# Patient Record
Sex: MALE | Race: WHITE | Employment: FULL TIME | URBAN - METROPOLITAN AREA
[De-identification: names, ages, dates, MRNs, and addresses within clinical notes are randomized per-mention and may not be internally consistent; named-entity substitution may affect disease eponyms.]

---

## 2018-03-15 ENCOUNTER — HOSPITAL ENCOUNTER (EMERGENCY)
Facility: HOSPITAL | Age: 58
Discharge: HOME | End: 2018-03-15
Attending: EMERGENCY MEDICINE
Payer: COMMERCIAL

## 2018-03-15 VITALS
TEMPERATURE: 99 F | HEART RATE: 58 BPM | OXYGEN SATURATION: 98 % | WEIGHT: 196 LBS | RESPIRATION RATE: 18 BRPM | SYSTOLIC BLOOD PRESSURE: 109 MMHG | DIASTOLIC BLOOD PRESSURE: 73 MMHG | BODY MASS INDEX: 27.44 KG/M2 | HEIGHT: 71 IN

## 2018-03-15 DIAGNOSIS — H11.423 CHEMOSIS OF CONJUNCTIVA OF BOTH EYES: Primary | ICD-10-CM

## 2018-03-15 PROCEDURE — 99283 EMERGENCY DEPT VISIT LOW MDM: CPT

## 2018-03-15 RX ORDER — TETRACAINE HYDROCHLORIDE 5 MG/ML
1 SOLUTION OPHTHALMIC ONCE
Status: COMPLETED | OUTPATIENT
Start: 2018-03-15 | End: 2018-03-15

## 2018-03-15 RX ORDER — CIPROFLOXACIN HYDROCHLORIDE 3.5 MG/ML
2 SOLUTION/ DROPS TOPICAL
Qty: 1 BOTTLE | Refills: 0 | Status: SHIPPED | OUTPATIENT
Start: 2018-03-15 | End: 2018-03-25

## 2018-03-15 NOTE — ED INITIAL ASSESSMENT (HPI)
Patient reports eye redness/feeling of pressure to bilateral eyes, reports he noticed the irritation 20 min ago and took his contacts out.

## 2018-03-16 NOTE — ED PROVIDER NOTES
Patient Seen in: [facility] Emergency Department    History   Patient presents with:   Eye Visual Problem (opthalmic)    Stated Complaint: eye problem     HPI    41-year-old male presents with redness and swelling to bilateral eyes that began approxi injection with reactive chemosis. No significant drainage. Under examination with floor seen there is no evidence of abrasion/laceration/lesions to the cornea or sclera.   Oropharynx clear, mucous membranes moist   Extremities: no edema, normal peripheral

## 2018-03-16 NOTE — ED NOTES
Report to [facility] RN at this time. Patient waiting to be seen by MD at this time. Resting comfortably on cart. Wife at bedside.

## 2018-05-24 ENCOUNTER — HOSPITAL ENCOUNTER (EMERGENCY)
Facility: HOSPITAL | Age: 58
Discharge: STILL ADMITTED OR ADMINISTRATIVE | End: 2018-05-25
Payer: COMMERCIAL

## 2018-07-16 ENCOUNTER — LAB ENCOUNTER (OUTPATIENT)
Dept: LAB | Facility: HOSPITAL | Age: 58
End: 2018-07-16
Payer: COMMERCIAL

## 2018-07-16 DIAGNOSIS — R69 DIAGNOSIS UNKNOWN: Primary | ICD-10-CM

## 2019-04-16 ENCOUNTER — APPOINTMENT (OUTPATIENT)
Dept: GENERAL RADIOLOGY | Facility: HOSPITAL | Age: 59
End: 2019-04-16
Payer: COMMERCIAL

## 2019-04-16 ENCOUNTER — HOSPITAL ENCOUNTER (EMERGENCY)
Facility: HOSPITAL | Age: 59
Discharge: HOME | End: 2019-04-16
Payer: COMMERCIAL

## 2019-04-16 VITALS
TEMPERATURE: 98 F | SYSTOLIC BLOOD PRESSURE: 141 MMHG | RESPIRATION RATE: 18 BRPM | DIASTOLIC BLOOD PRESSURE: 68 MMHG | OXYGEN SATURATION: 100 % | HEART RATE: 57 BPM | BODY MASS INDEX: 27 KG/M2 | WEIGHT: 190 LBS

## 2019-04-16 DIAGNOSIS — S46.911A ELBOW STRAIN, RIGHT, INITIAL ENCOUNTER: Primary | ICD-10-CM

## 2019-04-16 PROCEDURE — 99283 EMERGENCY DEPT VISIT LOW MDM: CPT

## 2019-04-16 PROCEDURE — 73080 X-RAY EXAM OF ELBOW: CPT

## 2019-04-16 NOTE — ED PROVIDER NOTES
Patient Seen in: [facility] Emergency Department    History   Patient presents with:  Upper Extremity Injury (musculoskeletal)    Stated Complaint: right elbow injury lifting weight.      HPI    This is a 52-year-old male who injured his right elbow respiratory distress    HEENT: There is no signs of trauma. Oral mucosa is wet. Lungs: Clear to auscultation without wheezing or retractions    Cardiovascular: Regular without murmurs  There is tenderness in the medial epicondyles.   And is painful on t There is no fracture, subluxation or dislocation. SOFT TISSUES:  Negative. No visible soft tissue swelling. EFFUSION:  None visible. OTHER:  Negative. CONCLUSION:  Mild spurring of the lateral distal humeral epicondyle.   No acute fracture, subluxatio

## 2019-04-17 NOTE — ED NOTES
Pt hand grasps equal . Pt stating he felt a pop and is concerned because he had elbow surgery years ago. No deformity or edema noted.

## 2019-11-03 ENCOUNTER — APPOINTMENT (OUTPATIENT)
Dept: GENERAL RADIOLOGY | Facility: HOSPITAL | Age: 59
End: 2019-11-03
Attending: EMERGENCY MEDICINE
Payer: COMMERCIAL

## 2019-11-03 ENCOUNTER — HOSPITAL ENCOUNTER (EMERGENCY)
Facility: HOSPITAL | Age: 59
Discharge: HOME | End: 2019-11-03
Attending: EMERGENCY MEDICINE
Payer: COMMERCIAL

## 2019-11-03 VITALS
DIASTOLIC BLOOD PRESSURE: 76 MMHG | OXYGEN SATURATION: 95 % | SYSTOLIC BLOOD PRESSURE: 122 MMHG | RESPIRATION RATE: 13 BRPM | HEART RATE: 69 BPM | HEIGHT: 71 IN | BODY MASS INDEX: 25.9 KG/M2 | TEMPERATURE: 98 F | WEIGHT: 185 LBS

## 2019-11-03 DIAGNOSIS — J06.9 UPPER RESPIRATORY TRACT INFECTION, UNSPECIFIED TYPE: Primary | ICD-10-CM

## 2019-11-03 PROCEDURE — 93010 ELECTROCARDIOGRAM REPORT: CPT

## 2019-11-03 PROCEDURE — 80053 COMPREHEN METABOLIC PANEL: CPT | Performed by: EMERGENCY MEDICINE

## 2019-11-03 PROCEDURE — 71046 X-RAY EXAM CHEST 2 VIEWS: CPT | Performed by: EMERGENCY MEDICINE

## 2019-11-03 PROCEDURE — 85025 COMPLETE CBC W/AUTO DIFF WBC: CPT | Performed by: EMERGENCY MEDICINE

## 2019-11-03 PROCEDURE — 36415 COLL VENOUS BLD VENIPUNCTURE: CPT

## 2019-11-03 PROCEDURE — 99285 EMERGENCY DEPT VISIT HI MDM: CPT

## 2019-11-03 PROCEDURE — 99284 EMERGENCY DEPT VISIT MOD MDM: CPT

## 2019-11-03 RX ORDER — DOXYCYCLINE HYCLATE 100 MG/1
100 CAPSULE ORAL 2 TIMES DAILY
Qty: 14 CAPSULE | Refills: 0 | Status: SHIPPED | OUTPATIENT
Start: 2019-11-03 | End: 2019-11-10

## 2019-11-03 RX ORDER — CODEINE PHOSPHATE AND GUAIFENESIN 10; 100 MG/5ML; MG/5ML
5 SOLUTION ORAL EVERY 6 HOURS PRN
Qty: 120 ML | Refills: 0 | Status: SHIPPED | OUTPATIENT
Start: 2019-11-03 | End: 2021-04-11

## 2019-11-03 NOTE — ED INITIAL ASSESSMENT (HPI)
Pt to ed with bronchitis dx last week at urgent care. Has been taking abx  Since thurs. rpts left sided chest pain, cough and sputum seem to be worsening.

## 2019-11-03 NOTE — ED PROVIDER NOTES
Patient Seen in: [facility] Emergency Department      History   Patient presents with:  Cough/URI    Stated Complaint:     HPI    Patient is a 52-year-old male history of high cholesterol and heart disease who presents with a productive cough, worse normal.      Pupils: Pupils are equal, round, and reactive to light. Neck:      Musculoskeletal: Normal range of motion and neck supple. Cardiovascular:      Rate and Rhythm: Normal rate and regular rhythm. Heart sounds: Normal heart sounds.    Pul days.  Has been on Augmentin for 3 days but reports seems like it is getting worse. However, no fevers or chills. On arrival here is afebrile, no distress. Check basic labs to see about his hydration, and a chest x-ray.       Update at 9:25 AM.  Labs, ch

## 2020-10-05 ENCOUNTER — OFFICE VISIT (OUTPATIENT)
Dept: SURGERY | Facility: CLINIC | Age: 60
End: 2020-10-05
Payer: COMMERCIAL

## 2020-10-05 VITALS — SYSTOLIC BLOOD PRESSURE: 145 MMHG | DIASTOLIC BLOOD PRESSURE: 90 MMHG | HEART RATE: 90 BPM

## 2020-10-05 DIAGNOSIS — N40.1 BPH WITH OBSTRUCTION/LOWER URINARY TRACT SYMPTOMS: ICD-10-CM

## 2020-10-05 DIAGNOSIS — R82.90 URINE FINDING: Primary | ICD-10-CM

## 2020-10-05 DIAGNOSIS — N13.8 BPH WITH OBSTRUCTION/LOWER URINARY TRACT SYMPTOMS: ICD-10-CM

## 2020-10-05 DIAGNOSIS — Z12.5 SCREENING PSA (PROSTATE SPECIFIC ANTIGEN): ICD-10-CM

## 2020-10-05 DIAGNOSIS — N45.1 LEFT EPIDIDYMITIS: ICD-10-CM

## 2020-10-05 PROCEDURE — 3077F SYST BP >= 140 MM HG: CPT | Performed by: UROLOGY

## 2020-10-05 PROCEDURE — 3080F DIAST BP >= 90 MM HG: CPT | Performed by: UROLOGY

## 2020-10-05 PROCEDURE — 99244 OFF/OP CNSLTJ NEW/EST MOD 40: CPT | Performed by: UROLOGY

## 2020-10-05 PROCEDURE — 81003 URINALYSIS AUTO W/O SCOPE: CPT | Performed by: UROLOGY

## 2020-10-05 RX ORDER — CIPROFLOXACIN 500 MG/1
500 TABLET, FILM COATED ORAL 2 TIMES DAILY
Qty: 28 TABLET | Refills: 0 | Status: SHIPPED | OUTPATIENT
Start: 2020-10-05 | End: 2020-10-19

## 2020-10-05 NOTE — PATIENT INSTRUCTIONS
Epididymitis  The epididymis is a small tube next to the testicle that stores sperm. Inflammation of the epididymis can cause pain and swelling in your scrotum.  The condition may be acute (sudden onset) or chronic (persisting for a longer period of pernell Home care  The following will help you care for yourself at home:  · Support the scrotum. When lying down, place a rolled towel under the scrotum. When walking, use an athletic supporter or 2 pairs of jockey-style underwear.   · Rest at home until the fever · Constipation can make you strain. This makes the pain worse. Avoid constipation by eating natural laxatives such as prunes, fresh fruits, and whole-grain cereals. If necessary, use a mild over-the-counter laxative such as colace for constipation.  [place]

## 2020-10-06 ENCOUNTER — LAB ENCOUNTER (OUTPATIENT)
Dept: LAB | Facility: HOSPITAL | Age: 60
End: 2020-10-06
Attending: UROLOGY
Payer: COMMERCIAL

## 2020-10-06 DIAGNOSIS — Z12.5 SCREENING PSA (PROSTATE SPECIFIC ANTIGEN): ICD-10-CM

## 2020-10-06 PROCEDURE — 36415 COLL VENOUS BLD VENIPUNCTURE: CPT

## 2020-10-28 ENCOUNTER — HOSPITAL ENCOUNTER (OUTPATIENT)
Dept: ULTRASOUND IMAGING | Facility: HOSPITAL | Age: 60
Discharge: HOME | End: 2020-10-28
Attending: UROLOGY
Payer: COMMERCIAL

## 2020-10-28 DIAGNOSIS — N45.1 LEFT EPIDIDYMITIS: ICD-10-CM

## 2020-10-28 PROCEDURE — 76870 US EXAM SCROTUM: CPT | Performed by: UROLOGY

## 2020-10-29 NOTE — PROGRESS NOTES
Fidel Lemus,  I have reviewed your test results and spoke to you on the phone. Everything looks OK. Would recommend you work on getting back pain under control and try the things we discussed over the phone and in the office.  Please call if you have any ques

## 2021-04-01 DIAGNOSIS — Z23 NEED FOR VACCINATION: ICD-10-CM

## 2021-04-11 ENCOUNTER — APPOINTMENT (OUTPATIENT)
Dept: CT IMAGING | Facility: HOSPITAL | Age: 61
DRG: 246 | End: 2021-04-11
Attending: INTERNAL MEDICINE
Payer: COMMERCIAL

## 2021-04-11 ENCOUNTER — APPOINTMENT (OUTPATIENT)
Dept: CT IMAGING | Facility: HOSPITAL | Age: 61
DRG: 246 | End: 2021-04-11
Attending: EMERGENCY MEDICINE
Payer: COMMERCIAL

## 2021-04-11 ENCOUNTER — APPOINTMENT (OUTPATIENT)
Dept: GENERAL RADIOLOGY | Facility: HOSPITAL | Age: 61
DRG: 246 | End: 2021-04-11
Attending: EMERGENCY MEDICINE
Payer: COMMERCIAL

## 2021-04-11 ENCOUNTER — HOSPITAL ENCOUNTER (INPATIENT)
Facility: HOSPITAL | Age: 61
LOS: 6 days | Discharge: HOME | DRG: 246 | End: 2021-04-17
Attending: EMERGENCY MEDICINE | Admitting: HOSPITALIST
Payer: COMMERCIAL

## 2021-04-11 DIAGNOSIS — R55 SYNCOPE AND COLLAPSE: Primary | ICD-10-CM

## 2021-04-11 DIAGNOSIS — S22.42XA CLOSED FRACTURE OF MULTIPLE RIBS OF LEFT SIDE, INITIAL ENCOUNTER: ICD-10-CM

## 2021-04-11 DIAGNOSIS — N17.9 AKI (ACUTE KIDNEY INJURY) (HCC): ICD-10-CM

## 2021-04-11 DIAGNOSIS — I48.91 ATRIAL FIBRILLATION, NEW ONSET (HCC): ICD-10-CM
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
## 2021-04-11 PROCEDURE — 71275 CT ANGIOGRAPHY CHEST: CPT | Performed by: INTERNAL MEDICINE

## 2021-04-11 PROCEDURE — 71101 X-RAY EXAM UNILAT RIBS/CHEST: CPT | Performed by: EMERGENCY MEDICINE

## 2021-04-11 PROCEDURE — 70450 CT HEAD/BRAIN W/O DYE: CPT | Performed by: EMERGENCY MEDICINE

## 2021-04-11 PROCEDURE — 99223 1ST HOSP IP/OBS HIGH 75: CPT | Performed by: INTERNAL MEDICINE

## 2021-04-11 RX ORDER — ACETAMINOPHEN 325 MG/1
650 TABLET ORAL EVERY 4 HOURS PRN
Status: DISCONTINUED | OUTPATIENT
Start: 2021-04-11 | End: 2021-04-17

## 2021-04-11 RX ORDER — ASPIRIN 325 MG
325 TABLET ORAL DAILY
Status: DISCONTINUED | OUTPATIENT
Start: 2021-04-11 | End: 2021-04-17

## 2021-04-11 RX ORDER — MORPHINE SULFATE 2 MG/ML
1 INJECTION, SOLUTION INTRAMUSCULAR; INTRAVENOUS EVERY 2 HOUR PRN
Status: DISCONTINUED | OUTPATIENT
Start: 2021-04-11 | End: 2021-04-13

## 2021-04-11 RX ORDER — NITROGLYCERIN 0.4 MG/1
0.4 TABLET SUBLINGUAL EVERY 5 MIN PRN
Status: DISCONTINUED | OUTPATIENT
Start: 2021-04-11 | End: 2021-04-17

## 2021-04-11 RX ORDER — MORPHINE SULFATE 4 MG/ML
4 INJECTION, SOLUTION INTRAMUSCULAR; INTRAVENOUS EVERY 30 MIN PRN
Status: DISCONTINUED | OUTPATIENT
Start: 2021-04-11 | End: 2021-04-11

## 2021-04-11 RX ORDER — HYDROCODONE BITARTRATE AND ACETAMINOPHEN 5; 325 MG/1; MG/1
1 TABLET ORAL EVERY 4 HOURS PRN
Status: DISCONTINUED | OUTPATIENT
Start: 2021-04-11 | End: 2021-04-14

## 2021-04-11 RX ORDER — HYDROCODONE BITARTRATE AND ACETAMINOPHEN 5; 325 MG/1; MG/1
2 TABLET ORAL EVERY 4 HOURS PRN
Status: DISCONTINUED | OUTPATIENT
Start: 2021-04-11 | End: 2021-04-14

## 2021-04-11 RX ORDER — ONDANSETRON 2 MG/ML
4 INJECTION INTRAMUSCULAR; INTRAVENOUS EVERY 6 HOURS PRN
Status: DISCONTINUED | OUTPATIENT
Start: 2021-04-11 | End: 2021-04-17

## 2021-04-11 RX ORDER — HYDROCODONE BITARTRATE AND ACETAMINOPHEN 5; 325 MG/1; MG/1
1 TABLET ORAL ONCE
Status: COMPLETED | OUTPATIENT
Start: 2021-04-11 | End: 2021-04-11

## 2021-04-11 RX ORDER — METOPROLOL TARTRATE 5 MG/5ML
5 INJECTION INTRAVENOUS EVERY 4 HOURS PRN
Status: DISCONTINUED | OUTPATIENT
Start: 2021-04-11 | End: 2021-04-17

## 2021-04-11 RX ORDER — MORPHINE SULFATE 4 MG/ML
4 INJECTION, SOLUTION INTRAMUSCULAR; INTRAVENOUS EVERY 2 HOUR PRN
Status: DISCONTINUED | OUTPATIENT
Start: 2021-04-11 | End: 2021-04-13

## 2021-04-11 RX ORDER — SODIUM CHLORIDE 9 MG/ML
INJECTION, SOLUTION INTRAVENOUS CONTINUOUS
Status: DISCONTINUED | OUTPATIENT
Start: 2021-04-11 | End: 2021-04-14

## 2021-04-11 RX ORDER — MORPHINE SULFATE 2 MG/ML
2 INJECTION, SOLUTION INTRAMUSCULAR; INTRAVENOUS EVERY 2 HOUR PRN
Status: DISCONTINUED | OUTPATIENT
Start: 2021-04-11 | End: 2021-04-13

## 2021-04-11 NOTE — ED QUICK NOTES
Came back from Xray, per Xray tech, pt loss consciousness while doing the last view for xray. Complained of mild nausea, seen pt in room EKG ordered. To monitor, + pallor awake & cohernt. Dr espino informed about fall, @ bedside.  Small abrasion to forehea

## 2021-04-11 NOTE — H&P
[facility] [place] HOSPITALIST                                                               History & Physical         Merlinda Barb Patient Status:  Inpatient    1960 MRN [id number]   [facility] 8NE-A Attending Russ Choi MD   [facility] Day # 0 P of angioplasty    • Other and unspecified hyperlipidemia        Past Surgical History:   Procedure Laterality Date   • HERNIA SURGERY     • OTHER      reattachment tendon elbow bilaterl   • REMOVAL OF TONSILS,12+ Y/O     • TONSILLECTOMY         Family hist Value Date    WBC 9.2 04/11/2021    HGB 16.5 04/11/2021    HCT 47.4 04/11/2021    .0 04/11/2021    CREATSERUM 1.68 04/11/2021    BUN 30 04/11/2021     04/11/2021    K 5.0 04/11/2021     04/11/2021    CO2 24.0 04/11/2021     04/11/ [employer]) NRDR ([facility]) which includes the Dose Index   Registry.       PATIENT STATED HISTORY: (As transcribed by Technologist)  Syncopal episode at home, syncopal episode also in xray. Injury above right orbit.

## 2021-04-11 NOTE — PLAN OF CARE
Pt arrived from ER with spouse at bedside. C/O severe pain to left side of ribs. No lightheadedness or dizziness. No CP or SOB. Able to stand pt for orthostatics with 2 RNs present. Orthostatics positive but asymptomatic. AF on tele, low 100's.   Pt jenna Evaluate fluid balance, assess for edema, trend weights  Outcome: Progressing  Goal: Absence of cardiac arrhythmias or at baseline  Description: INTERVENTIONS:  - Continuous cardiac monitoring, monitor vital signs, obtain 12 lead EKG if indicated  - Evalua

## 2021-04-11 NOTE — ED QUICK NOTES
Report to Deon Ott. Transport paged. Pt updated on room number and eta to floor. MD remains at bedside.

## 2021-04-11 NOTE — ED INITIAL ASSESSMENT (HPI)
Jovana Hugo @ 0100 today while in the washroom, landed on L side.  Increasing pain with breathing since then denies any head injury or LOC

## 2021-04-11 NOTE — ED PROVIDER NOTES
Patient Seen in: [facility] Emergency Department      History   Patient presents with:  Fall    Stated Complaint: Garry Genin at 0100 into side of toliet. Rib pain with SIMA.  98% RA on arrival. Denies *    HPI/Subjective:   HPI    21-year-old male presents weekends    Drug use: No             Review of Systems    Positive for stated complaint: Fell at 0100 into side of toliet. Rib pain with SIMA. 98% RA on arrival. Denies *  Other systems are as noted in HPI. Constitutional and vital signs reviewed.       All ---------                               -----------         ------                     CBC W/ DIFFERENTIAL[[id number]]          Abnormal            Final result                 Please view results for these tests on the individual orders.    SOTO lung sounds bilaterally. Norco for pain. X-ray ribs ordered. While the patient was in x-ray getting his rib films completed he had a syncopal episode. He states he has no recollection of what happened.   He was not hit by any sudden pain as far as he

## 2021-04-11 NOTE — ED QUICK NOTES
Round on pt. Updated on eta for room number. Pt denies any needs. Lights turned off in room. Family at bedside.

## 2021-04-11 NOTE — CONSULTS
[facility]  Consultation Note    Ro Carias Patient Status:  Emergency    1960 MRN [id number]   Location [address] Attending Vikas Taylor MD   Hosp Day # 0 PCP Rani Crowe MD     Cardiology att SURGERY     • OTHER      reattachment tendon elbow bilaterl   • REMOVAL OF TONSILS,12+ Y/O     • TONSILLECTOMY       Family History   Problem Relation Age of Onset   • Heart Disease Father    • Cancer Father      Social History:   reports that he has quit AM            Assessment / Plan:      Hannah Hollingsworth is a 62year old male with PMH significant for CAD, possible seizure disorder, HL presenting to [facility] after falling in the bathroom at home.      Syncope  - 2 x today with associated rib fractur

## 2021-04-12 ENCOUNTER — APPOINTMENT (OUTPATIENT)
Dept: CV DIAGNOSTICS | Facility: HOSPITAL | Age: 61
DRG: 246 | End: 2021-04-12
Attending: PHYSICIAN ASSISTANT
Payer: COMMERCIAL

## 2021-04-12 ENCOUNTER — APPOINTMENT (OUTPATIENT)
Dept: CT IMAGING | Facility: HOSPITAL | Age: 61
DRG: 246 | End: 2021-04-12
Attending: INTERNAL MEDICINE
Payer: COMMERCIAL

## 2021-04-12 ENCOUNTER — APPOINTMENT (OUTPATIENT)
Dept: INTERVENTIONAL RADIOLOGY/VASCULAR | Facility: HOSPITAL | Age: 61
DRG: 246 | End: 2021-04-12
Attending: NURSE PRACTITIONER
Payer: COMMERCIAL

## 2021-04-12 ENCOUNTER — APPOINTMENT (OUTPATIENT)
Dept: INTERVENTIONAL RADIOLOGY/VASCULAR | Facility: HOSPITAL | Age: 61
DRG: 246 | End: 2021-04-12
Attending: INTERNAL MEDICINE
Payer: COMMERCIAL

## 2021-04-12 PROCEDURE — 4A033BC MEASUREMENT OF ARTERIAL PRESSURE, CORONARY, PERCUTANEOUS APPROACH: ICD-10-PCS | Performed by: INTERNAL MEDICINE

## 2021-04-12 PROCEDURE — 0JH602Z INSERTION OF MONITORING DEVICE INTO CHEST SUBCUTANEOUS TISSUE AND FASCIA, OPEN APPROACH: ICD-10-PCS | Performed by: INTERNAL MEDICINE

## 2021-04-12 PROCEDURE — 93306 TTE W/DOPPLER COMPLETE: CPT | Performed by: PHYSICIAN ASSISTANT

## 2021-04-12 PROCEDURE — 99232 SBSQ HOSP IP/OBS MODERATE 35: CPT | Performed by: INTERNAL MEDICINE

## 2021-04-12 PROCEDURE — 4A023N7 MEASUREMENT OF CARDIAC SAMPLING AND PRESSURE, LEFT HEART, PERCUTANEOUS APPROACH: ICD-10-PCS | Performed by: INTERNAL MEDICINE

## 2021-04-12 PROCEDURE — B211YZZ FLUOROSCOPY OF MULTIPLE CORONARY ARTERIES USING OTHER CONTRAST: ICD-10-PCS | Performed by: INTERNAL MEDICINE

## 2021-04-12 PROCEDURE — 75574 CT ANGIO HRT W/3D IMAGE: CPT | Performed by: INTERNAL MEDICINE

## 2021-04-12 RX ORDER — NITROGLYCERIN 20 MG/100ML
INJECTION INTRAVENOUS
Status: COMPLETED
Start: 2021-04-12 | End: 2021-04-12

## 2021-04-12 RX ORDER — NITROGLYCERIN 0.4 MG/1
0.4 TABLET SUBLINGUAL ONCE
Status: DISCONTINUED | OUTPATIENT
Start: 2021-04-12 | End: 2021-04-12 | Stop reason: HOSPADM

## 2021-04-12 RX ORDER — METOPROLOL TARTRATE 50 MG/1
50 TABLET, FILM COATED ORAL ONCE AS NEEDED
Status: DISCONTINUED | OUTPATIENT
Start: 2021-04-12 | End: 2021-04-17 | Stop reason: ALTCHOICE

## 2021-04-12 RX ORDER — METOPROLOL TARTRATE 50 MG/1
50 TABLET, FILM COATED ORAL ONCE AS NEEDED
Status: ACTIVE | OUTPATIENT
Start: 2021-04-12 | End: 2021-04-12

## 2021-04-12 RX ORDER — METOPROLOL TARTRATE 50 MG/1
100 TABLET, FILM COATED ORAL ONCE
Status: DISCONTINUED | OUTPATIENT
Start: 2021-04-13 | End: 2021-04-14

## 2021-04-12 RX ORDER — METOPROLOL TARTRATE 5 MG/5ML
5 INJECTION INTRAVENOUS SEE ADMIN INSTRUCTIONS
Status: DISCONTINUED | OUTPATIENT
Start: 2021-04-12 | End: 2021-04-12 | Stop reason: HOSPADM

## 2021-04-12 RX ORDER — METOPROLOL TARTRATE 50 MG/1
50 TABLET, FILM COATED ORAL ONCE
Status: DISCONTINUED | OUTPATIENT
Start: 2021-04-12 | End: 2021-04-14

## 2021-04-12 RX ORDER — NITROGLYCERIN 0.4 MG/1
TABLET SUBLINGUAL
Status: DISPENSED
Start: 2021-04-12 | End: 2021-04-12

## 2021-04-12 RX ORDER — METOPROLOL TARTRATE 50 MG/1
100 TABLET, FILM COATED ORAL ONCE
Status: DISCONTINUED | OUTPATIENT
Start: 2021-04-12 | End: 2021-04-14

## 2021-04-12 RX ORDER — METOPROLOL TARTRATE 100 MG/1
100 TABLET ORAL ONCE AS NEEDED
Status: ACTIVE | OUTPATIENT
Start: 2021-04-12 | End: 2021-04-12

## 2021-04-12 RX ORDER — VERAPAMIL HYDROCHLORIDE 2.5 MG/ML
INJECTION, SOLUTION INTRAVENOUS
Status: COMPLETED
Start: 2021-04-12 | End: 2021-04-12

## 2021-04-12 RX ORDER — METOPROLOL TARTRATE 100 MG/1
100 TABLET ORAL ONCE AS NEEDED
Status: DISCONTINUED | OUTPATIENT
Start: 2021-04-12 | End: 2021-04-17 | Stop reason: ALTCHOICE

## 2021-04-12 RX ORDER — HEPARIN SODIUM 5000 [USP'U]/ML
INJECTION, SOLUTION INTRAVENOUS; SUBCUTANEOUS
Status: DISCONTINUED
Start: 2021-04-12 | End: 2021-04-12 | Stop reason: WASHOUT

## 2021-04-12 RX ORDER — LIDOCAINE HYDROCHLORIDE AND EPINEPHRINE 10; 10 MG/ML; UG/ML
INJECTION, SOLUTION INFILTRATION; PERINEURAL
Status: COMPLETED
Start: 2021-04-12 | End: 2021-04-12

## 2021-04-12 RX ORDER — LIDOCAINE HYDROCHLORIDE 10 MG/ML
INJECTION, SOLUTION EPIDURAL; INFILTRATION; INTRACAUDAL; PERINEURAL
Status: COMPLETED
Start: 2021-04-12 | End: 2021-04-12

## 2021-04-12 RX ORDER — DILTIAZEM HYDROCHLORIDE 5 MG/ML
5 INJECTION INTRAVENOUS SEE ADMIN INSTRUCTIONS
Status: DISCONTINUED | OUTPATIENT
Start: 2021-04-12 | End: 2021-04-12 | Stop reason: HOSPADM

## 2021-04-12 RX ORDER — HEPARIN SODIUM 5000 [USP'U]/ML
INJECTION, SOLUTION INTRAVENOUS; SUBCUTANEOUS
Status: COMPLETED
Start: 2021-04-12 | End: 2021-04-12

## 2021-04-12 RX ORDER — SODIUM CHLORIDE 9 MG/ML
INJECTION, SOLUTION INTRAVENOUS
Status: DISCONTINUED | OUTPATIENT
Start: 2021-04-13 | End: 2021-04-12 | Stop reason: HOSPADM

## 2021-04-12 RX ORDER — METOPROLOL TARTRATE 50 MG/1
50 TABLET, FILM COATED ORAL ONCE
Status: DISCONTINUED | OUTPATIENT
Start: 2021-04-13 | End: 2021-04-14

## 2021-04-12 RX ORDER — MIDAZOLAM HYDROCHLORIDE 1 MG/ML
INJECTION INTRAMUSCULAR; INTRAVENOUS
Status: COMPLETED
Start: 2021-04-12 | End: 2021-04-12

## 2021-04-12 NOTE — PROGRESS NOTES
04/12/21 0931 04/12/21 0934 04/12/21 0936   Vital Signs   Pulse 56 54 69   Heart Rate Source Monitor  --   --    Resp 17  --   --    Respiratory Quality Normal  --   --    BP 99/63 114/77 120/71   MAP (mmHg) 72 86 83   BP Location Right arm  --   --

## 2021-04-12 NOTE — PROGRESS NOTES
Cath:    LM: Normal.  LAD: 60% range prox and 60% mid plaques. iFR 0.86 (significant). CX: Mild disease. RCA: Dominant. Mild disease. IFR: significant in mid LAD after proper normilization and zeroing. Radial.    Suggest possible PCI to LAD.   Wou

## 2021-04-12 NOTE — IMAGING NOTE
Pt scheduled for CT gated coronary. Denies any contrast allergies. Denies taking Viagra, Cialis, Levitra or Imdur. Room # 4 used for scanning. O2 2L NC  HR 47 during scan at 1134   66 cc of 0.9 NS given. 70 cc of contrast given. Tolerated exam well.

## 2021-04-12 NOTE — PROCEDURES
ILR implant    HPI: 61 yo with recurrent syncope and negative monitoring x 48h (with negative monitor in past) here for ILR to determine etiology of syncope    Patient was prepped and draped in the usual sterile fashion.   Consent had previously been obtain

## 2021-04-12 NOTE — OCCUPATIONAL THERAPY NOTE
OCCUPATIONAL THERAPY QUICK EVALUATION - INPATIENT    Room Number: 0988/0368-L  Evaluation Date: 4/12/2021     Type of Evaluation: Quick Eval  Presenting Problem: syncope and collapse    Physician Order: IP Consult to Occupational Therapy  Reason for [facility] None                PAIN ASSESSMENT  Rating: Unable to rate  Location: L knee       COGNITION  WNL    RANGE OF MOTION AND STRENGTH ASSESSMENT  Upper extremity ROM is within functional limits     Upper extremity strength is within functional limits     NEUR present. Pt educated on d/c recommendation. Pt verbalized understanding. Patient End of Session: Up in chair;Needs met;Call light within reach;RN aware of session/findings; All patient questions and concerns addressed;SCDs in place; Family present    A

## 2021-04-12 NOTE — PROGRESS NOTES
[facility]  Progress Note    Afshan Jones Patient Status:  Inpatient    1960 MRN [id number]   [facility] 8NE-A Attending Elizabeth Bolden MD   Hosp Day # 1 PCP Lc Rosen MD     CC: Syncope and fall at home, intractable left-dunia chest  Psychiatric: Appropriate mood and affect    Labs:   Lab Results   Component Value Date    WBC 6.4 04/12/2021    HGB 13.9 04/12/2021    HCT 39.8 04/12/2021    .0 04/12/2021    CREATSERUM 1.28 04/12/2021    BUN 23 04/12/2021     04/12/202 Daily(Beta Blocker)        ASSESSMENT / PLAN:     1. Syncope and fall at home and left-sided Anterior left 8th and 9th rib fractures status post fall at home  1. Pain management  2. IV fluids  3. Fall precautions  4.  Incentive spirometry every hour while a

## 2021-04-12 NOTE — PLAN OF CARE
Patient had Linq device inserted today with Dr. Tabatha Cabral. Patient tolerated procedure well. Signal of Linq after insertion recorded @ 0.38 mV. mepilex dressing in place, CDI. Patient given Linq monitor. Patient brought back to 8609.  Patient and wife awar

## 2021-04-12 NOTE — PHYSICAL THERAPY NOTE
PT orders received, chart reviewed. Attempted to see pt this am on 2 occasions: first pt having echo, second pt down for CT scan. Will attempt to see pt later as time allows.  RENEE

## 2021-04-12 NOTE — PROGRESS NOTES
[facility] Cardiology Progress Note        Mercedez Harris Patient Status:  Inpatient    1960 MRN [id number]   [facility] 8NE-A Attending Noemi Dickens MD   Hosp Day # 1 PCP Jaylon Bernardo MD     Subjective:  No car <0.045       No results for input(s): PTP, INR in the last 168 hours.               Impression:  Syncope  - previous hx of possible seizures, patient is not post ictal.  This is likely orthostatic though no clear etiology  CTA chest negative.  -echo pending

## 2021-04-12 NOTE — PHYSICAL THERAPY NOTE
PHYSICAL THERAPY QUICK EVALUATION - INPATIENT    Room Number: 3321/5654-M  Evaluation Date: 4/12/2021  Presenting Problem: syncope and collapse  Physician Order: PT Eval and Treat    X-ray ribs 4/12/21:  CONCLUSION:     1.  Anterior left 8th and 9th rib f TOLERANCE  Pulse: 51  Heart Rate Source: Monitor     BP: 116/72  BP Location: Left arm  BP Method: Automatic  Patient Position: Standing    O2 WALK       AM-PAC '6-Clicks' INPATIENT SHORT FORM - BASIC MOBILITY  How much difficulty does the patient currentl edema observed. Increased time spent on education and discussion with pt regarding the importance of safety awareness, fall precautions and activity level.  Also discussed symptom management techniques for L rib pain and L knee pain such as positioning and

## 2021-04-12 NOTE — PROGRESS NOTES
Pt received from cath lab, placed on tele, Right Wrist with TR band (11 ml air per report), soft on palpation around TR band, radial pulses palpable. Lower mid chest incision with Mepilex, dressing CDI, soft on palpation.  Vital signs and site assessment pe

## 2021-04-12 NOTE — PLAN OF CARE
Pt alert and oriented x4. On RA. Denies any SOB. SB on tele. Continent to bowel and bladder. C/o pain at rib cage d/t fracture. Controlled with prn morphine and norco. Up with SBA. Orthos Q shift. Call light within reach. Will continue to monitor.      Prob

## 2021-04-12 NOTE — CONSULTS
I was asked by Dr. Shaheen Wadsworth to evaluate for AF, syncope    62year old male with PMhx as below, previously diagnosed with ? szr disorder 2014 admitted with syncope    USOH, palps in last year he noticed in paroxysmal fashion, unclear if he had last night. NT/ND, BS+x4  Back: No CVA tenderness  Extremities: Warm, dry, no LE edema bilat  Pulses: 2+ bilat DP  Skin: no rashes or legions noted  Neurological:  AAOx3, MAEW  Psych: Appropriate affect and response to questions      Labs:    HEM:  Recent Labs   Lab 0

## 2021-04-12 NOTE — PLAN OF CARE
Pt A&O x 4, SPO2 98% on RA, sinus dre on tele. Up with contact guard assist with walker. Orthostatics negative. Patient slight dizzy getting out of bed. C/o Left ribcage pain, PRN pain medication given, verbalize relief.  Scheduled for CTA gated coronary ordered  Outcome: Progressing

## 2021-04-13 ENCOUNTER — APPOINTMENT (OUTPATIENT)
Dept: INTERVENTIONAL RADIOLOGY/VASCULAR | Facility: HOSPITAL | Age: 61
DRG: 246 | End: 2021-04-13
Attending: INTERNAL MEDICINE
Payer: COMMERCIAL

## 2021-04-13 ENCOUNTER — APPOINTMENT (OUTPATIENT)
Dept: CT IMAGING | Facility: HOSPITAL | Age: 61
DRG: 246 | End: 2021-04-13
Attending: HOSPITALIST
Payer: COMMERCIAL

## 2021-04-13 PROCEDURE — 99232 SBSQ HOSP IP/OBS MODERATE 35: CPT | Performed by: INTERNAL MEDICINE

## 2021-04-13 PROCEDURE — B240ZZ3 ULTRASONOGRAPHY OF SINGLE CORONARY ARTERY, INTRAVASCULAR: ICD-10-PCS | Performed by: INTERNAL MEDICINE

## 2021-04-13 PROCEDURE — 4A023N7 MEASUREMENT OF CARDIAC SAMPLING AND PRESSURE, LEFT HEART, PERCUTANEOUS APPROACH: ICD-10-PCS | Performed by: INTERNAL MEDICINE

## 2021-04-13 PROCEDURE — B2111ZZ FLUOROSCOPY OF MULTIPLE CORONARY ARTERIES USING LOW OSMOLAR CONTRAST: ICD-10-PCS | Performed by: INTERNAL MEDICINE

## 2021-04-13 PROCEDURE — 027034Z DILATION OF CORONARY ARTERY, ONE ARTERY WITH DRUG-ELUTING INTRALUMINAL DEVICE, PERCUTANEOUS APPROACH: ICD-10-PCS | Performed by: INTERNAL MEDICINE

## 2021-04-13 PROCEDURE — 74176 CT ABD & PELVIS W/O CONTRAST: CPT | Performed by: HOSPITALIST

## 2021-04-13 RX ORDER — HYDROMORPHONE HYDROCHLORIDE 1 MG/ML
2 INJECTION, SOLUTION INTRAMUSCULAR; INTRAVENOUS; SUBCUTANEOUS EVERY 2 HOUR PRN
Status: DISCONTINUED | OUTPATIENT
Start: 2021-04-13 | End: 2021-04-17

## 2021-04-13 RX ORDER — NALOXONE HYDROCHLORIDE 4 MG/.1ML
4 SPRAY, METERED NASAL AS NEEDED
Qty: 1 KIT | Refills: 0 | Status: SHIPPED | OUTPATIENT
Start: 2021-04-13 | End: 2021-04-20

## 2021-04-13 RX ORDER — CLOPIDOGREL BISULFATE 75 MG/1
75 TABLET ORAL DAILY
Qty: 90 TABLET | Refills: 3 | Status: ON HOLD | OUTPATIENT
Start: 2021-04-14 | End: 2021-11-01

## 2021-04-13 RX ORDER — DOCUSATE SODIUM 100 MG/1
100 CAPSULE, LIQUID FILLED ORAL 2 TIMES DAILY
Status: DISCONTINUED | OUTPATIENT
Start: 2021-04-13 | End: 2021-04-17

## 2021-04-13 RX ORDER — HYDROMORPHONE HYDROCHLORIDE 1 MG/ML
0.5 INJECTION, SOLUTION INTRAMUSCULAR; INTRAVENOUS; SUBCUTANEOUS EVERY 2 HOUR PRN
Status: DISCONTINUED | OUTPATIENT
Start: 2021-04-13 | End: 2021-04-17

## 2021-04-13 RX ORDER — HYDROCODONE BITARTRATE AND ACETAMINOPHEN 5; 325 MG/1; MG/1
1 TABLET ORAL EVERY 4 HOURS PRN
Qty: 20 TABLET | Refills: 0 | Status: SHIPPED | OUTPATIENT
Start: 2021-04-13 | End: 2021-04-17

## 2021-04-13 RX ORDER — PSEUDOEPHEDRINE HCL 30 MG
100 TABLET ORAL 2 TIMES DAILY
Qty: 10 CAPSULE | Refills: 0 | Status: SHIPPED | OUTPATIENT
Start: 2021-04-13 | End: 2021-05-12

## 2021-04-13 RX ORDER — CLOPIDOGREL BISULFATE 75 MG/1
TABLET ORAL
Status: COMPLETED
Start: 2021-04-13 | End: 2021-04-13

## 2021-04-13 RX ORDER — HYDROMORPHONE HYDROCHLORIDE 1 MG/ML
1 INJECTION, SOLUTION INTRAMUSCULAR; INTRAVENOUS; SUBCUTANEOUS EVERY 2 HOUR PRN
Status: DISCONTINUED | OUTPATIENT
Start: 2021-04-13 | End: 2021-04-17

## 2021-04-13 RX ORDER — ASPIRIN 81 MG/1
81 TABLET ORAL DAILY
Qty: 90 TABLET | Refills: 3 | Status: ON HOLD | OUTPATIENT
Start: 2021-04-13 | End: 2021-04-25

## 2021-04-13 RX ORDER — ATORVASTATIN CALCIUM 80 MG/1
80 TABLET, FILM COATED ORAL NIGHTLY
Qty: 90 TABLET | Refills: 3 | Status: SHIPPED | OUTPATIENT
Start: 2021-04-13 | End: 2022-01-20

## 2021-04-13 RX ORDER — SODIUM CHLORIDE 9 MG/ML
INJECTION, SOLUTION INTRAVENOUS ONCE
Status: DISCONTINUED | OUTPATIENT
Start: 2021-04-13 | End: 2021-04-14

## 2021-04-13 RX ORDER — HEPARIN SODIUM 5000 [USP'U]/ML
INJECTION, SOLUTION INTRAVENOUS; SUBCUTANEOUS
Status: COMPLETED
Start: 2021-04-13 | End: 2021-04-13

## 2021-04-13 RX ORDER — ONDANSETRON 2 MG/ML
4 INJECTION INTRAMUSCULAR; INTRAVENOUS EVERY 6 HOURS PRN
Status: DISCONTINUED | OUTPATIENT
Start: 2021-04-13 | End: 2021-04-17

## 2021-04-13 RX ORDER — CLOPIDOGREL BISULFATE 75 MG/1
75 TABLET ORAL DAILY
Status: DISCONTINUED | OUTPATIENT
Start: 2021-04-13 | End: 2021-04-17

## 2021-04-13 RX ORDER — LIDOCAINE HYDROCHLORIDE 10 MG/ML
INJECTION, SOLUTION EPIDURAL; INFILTRATION; INTRACAUDAL; PERINEURAL
Status: COMPLETED
Start: 2021-04-13 | End: 2021-04-13

## 2021-04-13 RX ORDER — NICARDIPINE HYDROCHLORIDE 2.5 MG/ML
INJECTION INTRAVENOUS
Status: DISCONTINUED
Start: 2021-04-13 | End: 2021-04-13 | Stop reason: WASHOUT

## 2021-04-13 RX ORDER — ATORVASTATIN CALCIUM 80 MG/1
80 TABLET, FILM COATED ORAL NIGHTLY
Status: DISCONTINUED | OUTPATIENT
Start: 2021-04-13 | End: 2021-04-17

## 2021-04-13 RX ORDER — MIDAZOLAM HYDROCHLORIDE 1 MG/ML
INJECTION INTRAMUSCULAR; INTRAVENOUS
Status: COMPLETED
Start: 2021-04-13 | End: 2021-04-13

## 2021-04-13 NOTE — PROGRESS NOTES
04/13/21 0619 04/13/21 0622 04/13/21 0624   Vital Signs   /79 132/75 118/73   MAP (mmHg) 92 90 83   BP Location Left arm Left arm Left arm   BP Method Automatic Automatic Automatic   Patient Position Lying Sitting Standing

## 2021-04-13 NOTE — PROCEDURES
[facility]    PATIENT'S NAME: Gladys Flor   ATTENDING PHYSICIAN: Fco Flor M.D. OPERATING PHYSICIAN: Janusz Sharp.  Tahir Garcia MD   PATIENT ACCOUNT#:   [id number]    LOCATION:  [address] [place] [address]  MEDICAL RECORD #:   [id number]       DATE OF BIRTH:  [birth year] to moderate diffuse disease. CONCLUSIONS:  A 62year-old with syncope. The patient does have a FFR (IFR) positive LAD. Clearly, there is not a critical lesion. This may have contributed to his syncope. For now, the patient will get a LINQ monitor.

## 2021-04-13 NOTE — PROCEDURES
[address]    PATIENT'S NAME: Zully    ATTENDING PHYSICIAN: Brenna Mae M.D. OPERATING PHYSICIAN: Harriett Valentine.  Sharyle Poisson, MD   PATIENT ACCOUNT#:   [id number]    LOCATION:  [address] [place] [address]  MEDICAL RECORD #:   [id number]       DATE OF BIRTH:  [birth year] Clearly, aggressive medical management is indicated in this young patient with CAD. Dictated By Aretha Aguirre MD  d: 04/13/2021 14:33:06  t: 04/13/2021 17:58:18  [facility] [id number]  [facility]/

## 2021-04-13 NOTE — PROGRESS NOTES
Cards    PCI to LAD with NEELA, 3.0x48mm Synergy. Posted with 3.5mm NC to 16ATM. IVUS with excellent result and good apposition. Femoral.    Perclose. Can DC today. FU with Antonina Lam or ROSALIE in 1-2 weeks.     [facility] MD

## 2021-04-13 NOTE — PROGRESS NOTES
[facility]  Progress Note    Yvan Revering Patient Status:  Inpatient    1960 MRN [id number]   [facility] 8NE-A Attending Abby Campos MD   Hosp Day # 2 PCP Roger Good MD     CC: Syncope and fall at home, intractable left-dunia lesions. No erythema.   No bruising seen at this time on the left side of the chest  Psychiatric: Appropriate mood and affect    Labs:   Lab Results   Component Value Date    WBC 5.9 04/13/2021    HGB 13.1 04/13/2021    HCT 39.4 04/13/2021    .0 04/1 every hour while awake  5. PT OT eval  6. Troponin negative  7. Seen by cardiology  2. Orthostatic hypotension with syncope at home  1. IV fluids  2. Normal saline bolus given yesterday and on admission  3. Orthostatics resolved today  4.  Fall precautions

## 2021-04-13 NOTE — PROGRESS NOTES
[facility] Cardiology Progress Note        Ashlee Byrd Patient Status:  Inpatient    1960 MRN [id number]   [facility] 8NE-A Attending Leah Marie MD   Hosp Day # 2 PCP Penny Bell MD     Subjective:  Grecia Gillespie 2.1   * 141* 109*       Recent Labs   Lab 04/11/21  0702   ALT 64*   AST 41*   ALB 3.8       Recent Labs   Lab 04/11/21  0702 04/11/21  1315 04/11/21  1731   TROP <0.045 <0.045 <0.045       No results for input(s): PTP, INR in the last 168 hours.

## 2021-04-13 NOTE — PLAN OF CARE
Alert and oriented x4. On RA. C/o chest pain d/t fractured ribs. Pain controlled with prn morphine/norco. SB on tele monitor. R radial incision site c/d/I, soft, no hematoma present. Left upper chest incision site c/d/I, covered with mepilex.  Continent to electrolytes and administer replacement therapy as ordered  Outcome: Progressing

## 2021-04-14 ENCOUNTER — APPOINTMENT (OUTPATIENT)
Dept: CT IMAGING | Facility: HOSPITAL | Age: 61
DRG: 246 | End: 2021-04-14
Attending: HOSPITALIST
Payer: COMMERCIAL

## 2021-04-14 PROCEDURE — 99255 IP/OBS CONSLTJ NEW/EST HI 80: CPT | Performed by: INTERNAL MEDICINE

## 2021-04-14 PROCEDURE — 74174 CTA ABD&PLVS W/CONTRAST: CPT | Performed by: HOSPITALIST

## 2021-04-14 PROCEDURE — 99233 SBSQ HOSP IP/OBS HIGH 50: CPT | Performed by: HOSPITALIST

## 2021-04-14 RX ORDER — SENNOSIDES 8.6 MG
8.6 TABLET ORAL 2 TIMES DAILY
Status: DISCONTINUED | OUTPATIENT
Start: 2021-04-14 | End: 2021-04-17

## 2021-04-14 RX ORDER — BISACODYL 10 MG
10 SUPPOSITORY, RECTAL RECTAL
Status: DISCONTINUED | OUTPATIENT
Start: 2021-04-14 | End: 2021-04-17

## 2021-04-14 RX ORDER — OXYCODONE HYDROCHLORIDE 10 MG/1
10 TABLET ORAL EVERY 4 HOURS PRN
Status: DISCONTINUED | OUTPATIENT
Start: 2021-04-14 | End: 2021-04-17

## 2021-04-14 RX ORDER — SODIUM CHLORIDE 9 MG/ML
INJECTION, SOLUTION INTRAVENOUS CONTINUOUS
Status: DISCONTINUED | OUTPATIENT
Start: 2021-04-14 | End: 2021-04-16

## 2021-04-14 RX ORDER — SODIUM CHLORIDE 9 MG/ML
INJECTION, SOLUTION INTRAVENOUS CONTINUOUS
Status: DISCONTINUED | OUTPATIENT
Start: 2021-04-14 | End: 2021-04-14

## 2021-04-14 RX ORDER — POLYETHYLENE GLYCOL 3350 17 G/17G
17 POWDER, FOR SOLUTION ORAL DAILY
Status: DISCONTINUED | OUTPATIENT
Start: 2021-04-14 | End: 2021-04-17

## 2021-04-14 NOTE — PROGRESS NOTES
[facility]  Progress Note    Katie Sinha Patient Status:  Inpatient    1960 MRN [id number]   [facility] 8NE-A Attending Grecia Bedoya MD   Hosp Day # 3 PCP Beka Oglesby MD     CC: Syncope and fall at home, intractable left-dunia Value Date    WBC 8.2 04/14/2021    HGB 9.7 04/14/2021    HCT 30.1 04/14/2021    .0 04/14/2021    CREATSERUM 1.56 04/14/2021    BUN 18 04/14/2021     04/14/2021    K 4.4 04/14/2021     04/14/2021    CO2 28.0 04/14/2021     04/14/2 (NORCO) 5-325 MG per tab 2 tablet, 2 tablet, Oral, Q4H PRN  lidocaine-menthol 4-1 % 1 patch, 1 patch, Transdermal, Daily  metoprolol Tartrate (LOPRESSOR) injection 5 mg, 5 mg, Intravenous, Q4H PRN  metoprolol Tartrate (LOPRESSOR) tab 25 mg, 25 mg, Oral, 2x

## 2021-04-14 NOTE — PROGRESS NOTES
[facility] Cardiology Progress Note        Liliana Lennon Patient Status:  Inpatient    1960 MRN [id number]   [facility] 8NE-A Attending Hodan Plaza MD   Hosp Day # 3 PCP Yeimi Calloway MD     Subjective:    Felicita Maza 04/14/21  0559   WBC 9.2 6.4 5.9 12.4* 8.2   HGB 16.5 13.9 13.1 11.7* 10.6*   .0 134.0* 125.0* 123.0* 122.0*       Chem:  Recent Labs   Lab 04/11/21  0702 04/11/21  1346 04/12/21  0542 04/13/21  0923 04/14/21  0559    140 141 140 139   K 5.0 4

## 2021-04-14 NOTE — PLAN OF CARE
Assumed care of pt @ 1415 from [address] [place] [address] Bypass, coming back from CT scan. Pt neurologically intact, complaining of 6/10 pain in L ribcage. Hematoma palpable on L side, bruising on L hip. Norco given for pain, no improvement.  Oxycodone ordered per hospitalist. Cesar Hackett

## 2021-04-14 NOTE — PROGRESS NOTES
1940    Pt. C/o nausea , cold and clammy and severe pain on his left mid back . Denies any chest pain ; s/p PCI , right groin soft , denies pain at the right groin.              Pt. With a big bruise at his upper hip area from the fall last 4/11. 2010 Zo

## 2021-04-14 NOTE — PLAN OF CARE
Denies c/o pain currently. We are working together as a team to work ahead of his pain and manage it well throughout the day. Umberto Rubio ok'd him to ambulate in the halls after the discovery of his retroperitoneal hematoma. Remains in SB, NL S1, S2, RRR. baseline  Description: INTERVENTIONS:  - Continuous cardiac monitoring, monitor vital signs, obtain 12 lead EKG if indicated  - Evaluate effectiveness of antiarrhythmic and heart rate control medications as ordered  - Initiate emergency measures for life t

## 2021-04-14 NOTE — CM/SW NOTE
Care Progression Note:  Active Acute Medical Issue:   Syncope and collapse     Other Contributing Medical Factors/Dx. : s/p linq device and pci of proximal LAD lesion with NEELA    Length of stay: 3  GMLOS:  Avoidable Delays:   Discharge Barriers: post proced

## 2021-04-14 NOTE — PROGRESS NOTES
Report given to [facility] ([facility]). Went down for stat CT scan of abdomen and on priority transport list to transfer to [facility].

## 2021-04-14 NOTE — PLAN OF CARE
Patient is alert and oriented, complaining of intermittent left sided rib pain r/t fractures. Norco & morphine administered as ordered. Stool softener given today and education provided regarding narcotics and side effects.  Cath lab completed today, 1 sten obtain 12 lead EKG if indicated  - Evaluate effectiveness of antiarrhythmic and heart rate control medications as ordered  - Initiate emergency measures for life threatening arrhythmias  - Monitor electrolytes and administer replacement therapy as ordered

## 2021-04-14 NOTE — CONSULTS
[facility]  Report of Consultation    Janeen Chavez Patient Status:  Inpatient    1960 MRN [id number]   [address] Attending Jeff Daley MD   Hosp Day # 3 PCP Sharri Padilla MD     Reason for Consultation:  FREDA    History Injection, ONCE PRN  •  Clopidogrel Bisulfate (PLAVIX) tab 75 mg, 75 mg, Oral, Daily  •  ondansetron HCl (ZOFRAN) injection 4 mg, 4 mg, Intravenous, Q6H PRN  •  HYDROmorphone HCl (DILAUDID) 1 MG/ML injection 0.5 mg, 0.5 mg, Intravenous, Q2H PRN **OR** HYDR Cap, Take 100 mg by mouth 2 (two) times daily. lidocaine-menthol 4-1 % External Patch, Place 1 patch onto the skin daily. Naloxone HCl 4 MG/0.1ML Nasal Liquid, 4 mg by Nasal route as needed.  If patient remains unresponsive, repeat dose in other nostril 2 137 04/14/2021    CA 8.5 04/14/2021       BUN (mg/dL)   Date Value   04/14/2021 18   04/13/2021 15   04/12/2021 23 (H)   04/24/2014 20     CREATININE (mg/dL)   Date Value   04/24/2014 1.21 (H)     Creatinine (mg/dL)   Date Value   04/14/2021 1.56 (H)   04/ Please do not hesitate to call with any questions or concerns.        Stevie Stagers  4/14/2021  12:51 PM

## 2021-04-14 NOTE — CONSULTS
Jacinto Saldaña MD.  [facility]  Vascular and Endovascular Surgery     VASCULAR SURGERY   CONSULT NOTE      Name: Lala Cool   :   1960  [id number]     2021       REFERRING PHYSICIAN: Jones Noguera MD  PRIMARY CARE PHYSICIAN: unspecified hyperlipidemia      PAST SURGICAL HISTORY:   Past Surgical History:   Procedure Laterality Date   • HERNIA SURGERY     • OTHER      reattachment tendon elbow bilaterl   • REMOVAL OF TONSILS,12+ Y/O     • TONSILLECTOMY       MEDICATIONS:     Cur HYDROcodone-acetaminophen (NORCO) 5-325 MG per tab 2 tablet, 2 tablet, Oral, Q4H PRN  •  lidocaine-menthol 4-1 % 1 patch, 1 patch, Transdermal, Daily  •  metoprolol Tartrate (LOPRESSOR) injection 5 mg, 5 mg, Intravenous, Q4H PRN  •  metoprolol Tartrate (LO 99.7 97.7  --    .0  --  134.0* 125.0* 123.0* 122.0*  --     < > = values in this interval not displayed.        Recent Labs   Lab 04/11/21  0702 04/11/21  1346 04/12/21  0542 04/13/21  0923 04/14/21  0559    140 141 140 139   K 5.0 4.1 4.0 4.0 abnormality. CONCLUSION:  No acute intracranial hemorrhage.    Dictated by (CST): Jennifer Blackburn MD on 4/11/2021 at 8:54 AM     Finalized by (CST): Jennifer Blackburn MD on 4/11/2021 at 8:56 AM       CT ANGIOGRAPHY, CHEST (CPT=71275)    Result Date: 4/11/20 [phone] ---------------------------------------------------------------------------- Transthoracic Echocardiogram Name:Pam Peoples Deon Date: 2021 :  1960 Ht:  (71in)  BP: 95 / 58 MRN:  [id number]    Age:  59years normal. Systolic function was normal. The estimated ejection fraction was 55-60%. Left ventricular diastolic function parameters were normal. Left atrium:  The left atrium was mildly dilated. The left atrial volume was mildly increased. Right ventricle:   Elle Silveira 14 - 22  LV PW thickness, ED, PLAX                       0.8   cm     0.6 - 1.0  IVS/LV PW ratio, ED, PLAX                       1.01         ---------  LV ejection fraction                            59    %      >=55  LV E/e', lateral Tricuspid regurg peak velocity                  2.63  m/sec  ---------  Tricuspid peak RV-RA gradient                   28    mm Hg  ---------  Tricuspid maximal regurg velocity, PISA         2.53  m/sec  ---------   Systemic veins the left kidney, displacing the kidney anteriorly, and causing mass effect and indentation of the surfaces of the left kidney.   There is continued contrast staining of the left kidney, appears related to a cardiac CT scan performed yesterday were IV contra as well as hematoma extending inferior from the left kidney along the retroperitoneum anterior to the left psoas muscle.   Left kidney is also displaced, and deformed by the hematoma, and demonstrates abnormal persistent contrast staining of the parenchyma, CARDIAC OVER READ  COMPARISON:  None. INDICATIONS:  over read  TECHNIQUE:  CT images of the chest were obtained as part of a cardiac CT evaluation. Please refer to Cardiologist report for contrast volume and technique.   Dose reduction techniques were use Radiologist over-read for evaluation of non-vascular structures. Dose reduction techniques were used. Dose information is transmitted to the [address] of Radiology) NRDR ([address]) which includes the Dose Index Registry. branches. LCx demonstrates nonobstructive (<25% stenosis) soft plaque involving the proximal LCx. OM branches demonstrate no stenosis or plaque. Right coronary artery (RCA) is normal in size.  RCA demonstrates nonobstructive (25-49% stenosis) calcified nick care.     Sincerely,  Eligio Gamino MD  04/14/21   1:21 PM

## 2021-04-15 ENCOUNTER — APPOINTMENT (OUTPATIENT)
Dept: GENERAL RADIOLOGY | Facility: HOSPITAL | Age: 61
DRG: 246 | End: 2021-04-15
Attending: HOSPITALIST
Payer: COMMERCIAL

## 2021-04-15 PROCEDURE — 99291 CRITICAL CARE FIRST HOUR: CPT | Performed by: INTERNAL MEDICINE

## 2021-04-15 PROCEDURE — 99232 SBSQ HOSP IP/OBS MODERATE 35: CPT | Performed by: HOSPITALIST

## 2021-04-15 PROCEDURE — 71045 X-RAY EXAM CHEST 1 VIEW: CPT | Performed by: HOSPITALIST

## 2021-04-15 RX ORDER — LACTULOSE 10 G/15ML
20 SOLUTION ORAL ONCE
Status: COMPLETED | OUTPATIENT
Start: 2021-04-15 | End: 2021-04-15

## 2021-04-15 NOTE — PROGRESS NOTES
Vascular Surgery Progress Note    /66 (BP Location: Right arm)   Pulse 83   Temp (!) 101.2 °F (38.4 °C) (Temporal)   Resp 14   Ht 5' 11\" (1.803 m)   Wt 202 lb 2.6 oz (91.7 kg)   SpO2 98%   BMI 28.20 kg/m²     Recent Labs   Lab 04/13/21  2324 04/13/2

## 2021-04-15 NOTE — PROGRESS NOTES
[facility]  Nephrology Progress Note    Katie Sinha Patient Status:  Inpatient    1960 MRN [id number]   [address] Attending Ginger Wetzel MD   Hosp Day # 4 PCP Beka Oglesby MD       SUBJECTIVE:    Still with L chest wal 04/12/21  0542 04/13/21  0923 04/14/21  0559    140 141 140 139   K 5.0 4.1 4.0 4.0 4.4    109 111 110 107   CO2 24.0 26.0 25.0 27.0 28.0   BUN 30* 25* 23* 15 18   CREATSERUM 1.68* 1.39* 1.28 1.21 1.56*   CA 8.9 8.6 8.2* 8.5 8.5   MG  --   -- (LOPRESSOR) injection 5 mg, 5 mg, Intravenous, Q4H PRN  metoprolol Tartrate (LOPRESSOR) tab 25 mg, 25 mg, Oral, 2x Daily(Beta Blocker)          Impression/Plan:      #1.  FREDA- broad differential of FREDA would include prerenal azotemia in setting of worsening

## 2021-04-15 NOTE — PROGRESS NOTES
[facility] Cardiology Progress Note        Liliana Saji Patient Status:  Inpatient    1960 MRN [id number]   [facility] 8NE-A Attending Hodan Plaza MD   [address] Day # 4 PCP Yeimi Calloway MD     Subjective:  Did re 04/14/21  1451 04/14/21  1750 04/14/21  2309 04/15/21  0721   WBC 5.9  --  12.4*  --  8.2  --   --  7.7  --   --  8.0   HGB 13.1   < > 11.7*   < > 10.6*   < > 9.7* 8.9* 9.2* 8.7* 9.6*  9.6*   .0*  --  123.0*  --  122.0*  --   --  111.0*  --   --  12

## 2021-04-15 NOTE — PLAN OF CARE
No acute issues overnight. PO PRNs effective for pain control. Morning labs collected late, results pending.

## 2021-04-15 NOTE — PROGRESS NOTES
[facility]  Progress Note    Mercedez Harris Patient Status:  Inpatient    1960 MRN [id number]   [facility] 8NE-A Attending Noemi Dickens MD   [address] Day # 4 PCP Jaylon Bernardo MD     CC: Syncope and fall at home, intractable left-dunia affect    Labs:   Lab Results   Component Value Date    WBC 8.0 04/15/2021    HGB 9.6 04/15/2021    HGB 9.6 04/15/2021    HCT 27.3 04/15/2021    .0 04/15/2021    CREATSERUM 1.53 04/15/2021    BUN 14 04/15/2021     04/15/2021    K 4.0 04/15/202 650 mg, 650 mg, Oral, Q4H PRN  lidocaine-menthol 4-1 % 1 patch, 1 patch, Transdermal, Daily  metoprolol Tartrate (LOPRESSOR) injection 5 mg, 5 mg, Intravenous, Q4H PRN  metoprolol Tartrate (LOPRESSOR) tab 25 mg, 25 mg, Oral, 2x Daily(Beta Blocker)        A

## 2021-04-15 NOTE — PLAN OF CARE
Assumed care of pt this am, he is resting comfortably in bed with c/o left flank pain 6/10 on pain scale, pain meds given as ordered.  Pt states he has not had a bowel movement in 5 days, order rcvd for milk of mag and lactulose which was given will [place]

## 2021-04-16 PROCEDURE — 99232 SBSQ HOSP IP/OBS MODERATE 35: CPT | Performed by: HOSPITALIST

## 2021-04-16 PROCEDURE — 99291 CRITICAL CARE FIRST HOUR: CPT | Performed by: INTERNAL MEDICINE

## 2021-04-16 NOTE — PROGRESS NOTES
[facility]  Progress Note    Zachamaury Aretha Patient Status:  Inpatient    1960 MRN [id number]   [facility] 8NE-A Attending Luz Maria Miller MD   [facility] Day # 5 PCP Rani Crowe MD     CC: Syncope and fall at home, intractable left-dunia Component Value Date    HGB 8.2 04/16/2021    CREATSERUM 1.54 04/16/2021    BUN 10 04/16/2021     04/16/2021    K 4.2 04/16/2021     04/16/2021    CO2 32.0 04/16/2021     04/16/2021    CA 8.1 04/16/2021    ALB 2.7 04/16/2021    [address] mg, Oral, Q4H PRN  lidocaine-menthol 4-1 % 1 patch, 1 patch, Transdermal, Daily  metoprolol Tartrate (LOPRESSOR) injection 5 mg, 5 mg, Intravenous, Q4H PRN  metoprolol Tartrate (LOPRESSOR) tab 25 mg, 25 mg, Oral, 2x Daily(Beta Blocker)        ASSESSMENT /

## 2021-04-16 NOTE — PROGRESS NOTES
[facility] Cardiology Progress Note        Adinageovani Webber Patient Status:  Inpatient    1960 MRN [id number]   [facility] 8NE-A Attending Anthony Danielson MD   Hosp Day # 5 PCP Meet Mckeon MD     Subjective:  Did we 04/13/21  3291 04/13/21  0290 04/13/21  2324 04/13/21  2324 04/14/21  0559 04/14/21  1034 04/14/21  1451 04/14/21  1750 04/15/21  0721 04/15/21  1103 04/15/21  1804 04/16/21  0031 04/16/21  0419   WBC 5.9  --  12.4*  --  8.2  --  7.7  --  8.0  --   --   -- q12.  Same cardiac meds. Mobilize. May be ok for dc tomorrow.

## 2021-04-16 NOTE — PLAN OF CARE
Assumed care of pt @ 1930. Rec'd pt in bed, resting. Pt. Ambulated to bathroom without difficulty. Pt. Reports left flank pain, oxycodone given per MAR.  VSS, afebrile,  in no acute distress.

## 2021-04-16 NOTE — PROGRESS NOTES
[facility]  Nephrology Progress Note    Sherine Brody Patient Status:  Inpatient    1960 MRN [id number]   [facility] 6NE-A Attending Julieth Garcia MD   Hosp Day # 5 PCP Janeth Metcalf MD       SUBJECTIVE:  Doing well this AM, abd > = values in this interval not displayed.        Recent Labs   Lab 04/12/21  0542 04/13/21  0923 04/14/21  0559 04/15/21  0721 04/16/21  0419    140 139 139 139   K 4.0 4.0 4.4 4.0 4.2    110 107 107 107   CO2 25.0 27.0 28.0 28.0 32.0   BUN 23* SL tab 0.4 mg, 0.4 mg, Sublingual, Q5 Min PRN  ondansetron HCl (ZOFRAN) injection 4 mg, 4 mg, Intravenous, Q6H PRN  acetaminophen (TYLENOL) tab 650 mg, 650 mg, Oral, Q4H PRN  lidocaine-menthol 4-1 % 1 patch, 1 patch, Transdermal, Daily  metoprolol Tartrate

## 2021-04-16 NOTE — PLAN OF CARE
Problem: Patient/Family Goals  Goal: Patient/Family Long Term Goal  Description: Patient's Long Term Goal: To feel better enough to leave the hospital by 4/13    Interventions:  - IV fluids  - Pain controll  - Orthos Q shift  - Medications   - PT/ OT  - walk without assistance. Hemoglobin at 8.2, cardiology aware, no new orders. Plan is to monitor hemoglobin levels and transfer patient to a normal floor today.   Problem: PAIN - ADULT  Goal: Verbalizes/displays adequate comfort level or patient's stated gayle

## 2021-04-17 VITALS
DIASTOLIC BLOOD PRESSURE: 56 MMHG | BODY MASS INDEX: 28.21 KG/M2 | HEART RATE: 83 BPM | WEIGHT: 201.5 LBS | RESPIRATION RATE: 18 BRPM | SYSTOLIC BLOOD PRESSURE: 107 MMHG | TEMPERATURE: 99 F | HEIGHT: 71 IN | OXYGEN SATURATION: 95 %

## 2021-04-17 PROCEDURE — 99239 HOSP IP/OBS DSCHRG MGMT >30: CPT | Performed by: HOSPITALIST

## 2021-04-17 PROCEDURE — 99233 SBSQ HOSP IP/OBS HIGH 50: CPT | Performed by: INTERNAL MEDICINE

## 2021-04-17 RX ORDER — MELATONIN
325 2 TIMES DAILY WITH MEALS
Qty: 60 TABLET | Refills: 0 | Status: SHIPPED | OUTPATIENT
Start: 2021-04-17 | End: 2021-05-17

## 2021-04-17 RX ORDER — ASPIRIN 81 MG/1
81 TABLET ORAL DAILY
Status: DISCONTINUED | OUTPATIENT
Start: 2021-04-17 | End: 2021-04-17

## 2021-04-17 RX ORDER — AMOXICILLIN AND CLAVULANATE POTASSIUM 875; 125 MG/1; MG/1
1 TABLET, FILM COATED ORAL 2 TIMES DAILY
Qty: 8 TABLET | Refills: 0 | Status: SHIPPED | OUTPATIENT
Start: 2021-04-17 | End: 2021-04-20

## 2021-04-17 RX ORDER — OXYCODONE HYDROCHLORIDE 10 MG/1
10 TABLET ORAL EVERY 4 HOURS PRN
Qty: 30 TABLET | Refills: 0 | Status: SHIPPED | OUTPATIENT
Start: 2021-04-17 | End: 2021-05-12

## 2021-04-17 RX ORDER — ASPIRIN 81 MG/1
81 TABLET ORAL DAILY
Status: DISCONTINUED | OUTPATIENT
Start: 2021-04-18 | End: 2021-04-17

## 2021-04-17 NOTE — PLAN OF CARE
Received pt at 0730. A&o x4. NSR on tele. VSS. Denies chest pain and SOB. WNL on room air. Up ad roberto. Last BM 4/15 with good urine output. R wrist open to air, soft, no hematoma. R groin open to air, soft, no hematoma. C/o pain in L ribs.  Relieved by PRN p adequate comfort level or patient's stated pain goal  Description: INTERVENTIONS:  - Encourage pt to monitor pain and request assistance  - Assess pain using appropriate pain scale  - Administer analgesics based on type and severity of pain and evaluate re

## 2021-04-17 NOTE — PROGRESS NOTES
[facility]  Nephrology Progress Note    Diane Keller Patient Status:  Inpatient    1960 MRN [id number]   [facility] 6NE-A Attending Germania Oliver MD   [facility] Day # 6 PCP Yuki Clements MD       SUBJECTIVE:    Feels better today  Danielle Deutsch °C), Max:100 °F (37.8 °C)       Intake/Output Summary (Last 24 hours) at 4/17/2021 0919  Last data filed at 4/17/2021 0901  Gross per 24 hour   Intake 730 ml   Output 1 ml   Net 729 ml     Last 3 Weights  04/16/21 0557 : 201 lb 8 oz (91.4 kg)  04/15/21 043 f/u labs on Monday       2.  afib/CAD- s/p PCI. Now back  In NSR. Per cards     3. L perinephric hematoma- I suspect due to fall with [facility] for PCI. CTA w/ extravasation    4.   Anemia-  Due to above; stable         Questions/concerns were discussed with pa

## 2021-04-17 NOTE — PROGRESS NOTES
[facility]  Progress Note    Maddie Dominique Patient Status:  Inpatient    1960 MRN [id number]   [facility] 8NE-A Attending Salma Bhat MD   [facility] Day # 6 PCP Vernell Hewitt MD     CC: Syncope and fall at home, intractable left-dunia affect    Labs:   Lab Results   Component Value Date    HGB 8.6 04/17/2021    CREATSERUM 1.61 04/17/2021    BUN 10 04/17/2021     04/17/2021    K 4.0 04/17/2021     04/17/2021    CO2 29.0 04/17/2021    GLU 97 04/17/2021    CA 8.5 04/17/2021 PRN  lidocaine-menthol 4-1 % 1 patch, 1 patch, Transdermal, Daily  metoprolol Tartrate (LOPRESSOR) injection 5 mg, 5 mg, Intravenous, Q4H PRN  metoprolol Tartrate (LOPRESSOR) tab 25 mg, 25 mg, Oral, 2x Daily(Beta Blocker)        ASSESSMENT / PLAN:     1.  Radha Clark

## 2021-04-17 NOTE — PROGRESS NOTES
[facility] Cardiology Progress Note        Loly Oleary Patient Status:  Inpatient    1960 MRN [id number]   [facility] 8NE-A Attending Bigg Nieto MD   [facility] Day # 6 PCP Jyothi Collins MD     Subjective:  Did we 04/13/21  2324 04/14/21  0559 04/14/21  1034 04/14/21  1451 04/14/21  1750 04/15/21  0721 04/15/21  1103 04/15/21  1804 04/16/21  0031 04/16/21  0419 04/16/21  1814 04/17/21  0557   WBC 5.9  --  12.4*  --  8.2  --  7.7  --  8.0  --   --   --   --   --   -- LAD, confirmed significant with FFR.  -PCI of LAD on 4/13/21. 5. ARF  -dehydrated on admit, several contrast loads. -stable now, nephrology following. 6. Mixed HL    Plan:  CV status stable for dc. Med rec complete. Cards f/u in one week.

## 2021-04-18 NOTE — DISCHARGE SUMMARY
[facility] HOSPITALIST  DISCHARGE SUMMARY     Mark Jay Patient Status:  Inpatient    1960 MRN [id number]   [facility] 8NE-A Attending No att. providers found   Hosp Day # 6 PCP Cristo Collins MD     Date of Admission: 2021  Date o TRICIA    Brief Synopsis: Patient was admitted with rib fractures status post syncopal event, evaluated by cardiology, underwent cardiac work-up which elicited evidence of coronary artery disease, patient underwent PCI of left anterior descending.   He subseque Quantity: 90 tablet  Refills: 3     Clopidogrel Bisulfate 75 MG Tabs  Commonly known as: PLAVIX  Notes to patient: Must take every day with aspirin to keep stent open      Take 1 tablet (75 mg total) by mouth daily.    Quantity: 90 tablet  Refills: 3     do Bring a paper prescription for each of these medications  · Amoxicillin-Pot Clavulanate 875-125 MG Tabs  · ferrous sulfate 325 (65 FE) MG Tbec  · metoprolol Tartrate 25 MG Tabs  · OxyCODONE HCl IR 10 MG Tabs         [place]: [place]    Follow-up appoin edema.  -----------------------------------------------------------------------------------------------  PATIENT DISCHARGE INSTRUCTIONS: See electronic chart    Sandra Gamble MD    Time spent:  > 30 minutes

## 2021-04-19 ENCOUNTER — LAB ENCOUNTER (OUTPATIENT)
Dept: LAB | Facility: HOSPITAL | Age: 61
End: 2021-04-19
Attending: INTERNAL MEDICINE
Payer: COMMERCIAL

## 2021-04-19 DIAGNOSIS — N17.9 AKI (ACUTE KIDNEY INJURY) (HCC): ICD-10-CM

## 2021-04-19 PROCEDURE — 80048 BASIC METABOLIC PNL TOTAL CA: CPT

## 2021-04-19 PROCEDURE — 36415 COLL VENOUS BLD VENIPUNCTURE: CPT

## 2021-04-20 ENCOUNTER — HOSPITAL ENCOUNTER (INPATIENT)
Facility: HOSPITAL | Age: 61
LOS: 3 days | Discharge: HOME | DRG: 312 | End: 2021-04-25
Attending: EMERGENCY MEDICINE | Admitting: INTERNAL MEDICINE
Payer: COMMERCIAL

## 2021-04-20 ENCOUNTER — APPOINTMENT (OUTPATIENT)
Dept: GENERAL RADIOLOGY | Facility: HOSPITAL | Age: 61
DRG: 312 | End: 2021-04-20
Attending: EMERGENCY MEDICINE
Payer: COMMERCIAL

## 2021-04-20 ENCOUNTER — APPOINTMENT (OUTPATIENT)
Dept: CT IMAGING | Facility: HOSPITAL | Age: 61
DRG: 312 | End: 2021-04-20
Attending: EMERGENCY MEDICINE
Payer: COMMERCIAL

## 2021-04-20 DIAGNOSIS — R55 SYNCOPE AND COLLAPSE: Primary | ICD-10-CM

## 2021-04-20 DIAGNOSIS — R68.89 RIGORS: ICD-10-CM

## 2021-04-20 PROCEDURE — 71045 X-RAY EXAM CHEST 1 VIEW: CPT | Performed by: EMERGENCY MEDICINE

## 2021-04-20 PROCEDURE — 99220 INITIAL OBSERVATION CARE,LEVL III: CPT | Performed by: HOSPITALIST

## 2021-04-20 PROCEDURE — 74177 CT ABD & PELVIS W/CONTRAST: CPT | Performed by: EMERGENCY MEDICINE

## 2021-04-20 PROCEDURE — 71275 CT ANGIOGRAPHY CHEST: CPT | Performed by: EMERGENCY MEDICINE

## 2021-04-20 RX ORDER — MELATONIN
325 2 TIMES DAILY WITH MEALS
Status: DISCONTINUED | OUTPATIENT
Start: 2021-04-20 | End: 2021-04-25

## 2021-04-20 RX ORDER — ATORVASTATIN CALCIUM 80 MG/1
80 TABLET, FILM COATED ORAL NIGHTLY
Status: DISCONTINUED | OUTPATIENT
Start: 2021-04-20 | End: 2021-04-25

## 2021-04-20 RX ORDER — MULTIVITAMIN WITH FOLIC ACID 400 MCG
1 TABLET ORAL DAILY
COMMUNITY
End: 2021-09-16

## 2021-04-20 RX ORDER — SODIUM CHLORIDE 9 MG/ML
INJECTION, SOLUTION INTRAVENOUS CONTINUOUS
Status: DISCONTINUED | OUTPATIENT
Start: 2021-04-20 | End: 2021-04-21

## 2021-04-20 RX ORDER — ASPIRIN 81 MG/1
81 TABLET ORAL DAILY
Status: DISCONTINUED | OUTPATIENT
Start: 2021-04-20 | End: 2021-04-22

## 2021-04-20 RX ORDER — CLOPIDOGREL BISULFATE 75 MG/1
75 TABLET ORAL DAILY
Status: DISCONTINUED | OUTPATIENT
Start: 2021-04-20 | End: 2021-04-25

## 2021-04-20 RX ORDER — AMOXICILLIN AND CLAVULANATE POTASSIUM 875; 125 MG/1; MG/1
1 TABLET, FILM COATED ORAL 2 TIMES DAILY
Status: ON HOLD | COMMUNITY
Start: 2021-04-18 | End: 2021-04-25

## 2021-04-20 RX ORDER — ACETAMINOPHEN 325 MG/1
650 TABLET ORAL EVERY 6 HOURS PRN
Status: DISCONTINUED | OUTPATIENT
Start: 2021-04-20 | End: 2021-04-25

## 2021-04-20 RX ORDER — ONDANSETRON 2 MG/ML
4 INJECTION INTRAMUSCULAR; INTRAVENOUS EVERY 6 HOURS PRN
Status: DISCONTINUED | OUTPATIENT
Start: 2021-04-20 | End: 2021-04-25

## 2021-04-20 RX ORDER — AMOXICILLIN AND CLAVULANATE POTASSIUM 875; 125 MG/1; MG/1
1 TABLET, FILM COATED ORAL 2 TIMES DAILY
Status: COMPLETED | OUTPATIENT
Start: 2021-04-20 | End: 2021-04-21

## 2021-04-20 RX ORDER — MULTIVIT WITH MINERALS/LUTEIN
250 TABLET ORAL DAILY
COMMUNITY

## 2021-04-20 NOTE — H&P
[facility] HOSPITALIST  History and Physical     Brittany Heath Patient Status:  Emergency    1960 MRN [id number]   Location [address] [facility] Attending Sarah Deshpande MD   Hosp Day # 0 PCP Claude Judge MD     Chief Complaint: Rebekah Diamond Amoxicillin-Pot Clavulanate 875-125 MG Oral Tab, Take 1 tablet by mouth 2 (two) times daily. For 4 days, Disp: , Rfl:   metoprolol Tartrate 25 MG Oral Tab, Take 1 tablet (25 mg total) by mouth 2x Daily(Beta Blocker). , Disp: 60 tablet, Rfl: 0  OxyCODONE H cyanosis. Integument: No rashes or lesions. Psychiatric: Appropriate mood and affect.       Diagnostic Data:      Labs:  Recent Labs   Lab 04/13/21  2324 04/13/21  2324 04/14/21  0559 04/14/21  1034 04/14/21  1451 04/14/21  1750 04/15/21  0721 04/15/21 Epic.      ASSESSMENT / PLAN:     1. Syncope: Interrogate LINQ, will check with cardiology. IVF's given orthostasis, check cortisol as well. 2. RP bleed:  Hg is improved  3. CAD:  S/P PCI  4.  CKD:  Stable from discharge    Quality:  · DVT Prophylaxis: S

## 2021-04-20 NOTE — ED PROVIDER NOTES
Patient Seen in: [facility] Emergency Department      History   Patient presents with:  Difficulty Breathing    Stated Complaint: sob from [facility]     HPI/Subjective:   HPI    Patient is a 78-year-old male comes emergency room for evaluation drinks on weekends    Drug use: No             Review of Systems    Positive for stated complaint: sob from [facility] hospital   Other systems are as noted in HPI. Constitutional and vital signs reviewed.       All other systems reviewed and negative except as HGB 10.4 (*)     HCT 30.7 (*)     Monocyte Absolute 1.42 (*)     All other components within normal limits   TROPONIN I - Normal   RAPID SARS-COV-2 BY PCR - Normal   CBC WITH DIFFERENTIAL WITH PLATELET    Narrative:      The following orders were created fo post- injection of non-ionic intravenous contrast material. Multi-planar reformatted/3-D images were created to optimize visualization of vascular anatomy. Dose reduction techniques were used.  Dose information is transmitted to the [employer] o imaging. AORTA/VASCULAR:  Mild mixed plaque in the aorta and iliac arteries. RETROPERITONEUM:  See above. BOWEL/MESENTERY:  Uncomplicated colonic diverticulosis. Moderate feces in the colon. No large or small bowel dilatation. No free air.   Unremarkabl

## 2021-04-20 NOTE — CONSULTS
[facility] Cardiology  Consultation Note      Lala Cool Patient Status:  Emergency    1960 MRN [id number]   Location [facility] [facility] Attending Eufemia Sol MD   Hosp Day # 0 PCP Alfonso Reynolds MD     [facility] coming to office for device check up. Legs were wobbly, came out of elevator, eyes went glassy, \"I need help\", wife able to help him to chair, thinks he passed out. Device interrogated again- checked out fine.   pt has noted pers m), weight 185 lb (83.9 kg), SpO2 96 %. No data recorded.     Wt Readings from Last 3 Encounters:  04/20/21 : 185 lb (83.9 kg)  04/16/21 : 201 lb 8 oz (91.4 kg)  11/08/19 : 185 lb (83.9 kg)      General: Awake and alert; in no acute distress  HEENT: Samir Mauricio

## 2021-04-20 NOTE — ED QUICK NOTES
PACEMAKER INTERROGATION DONE AT 1506H . if med[employer]onic rep called back for report they have to call  office([phone])

## 2021-04-21 PROBLEM — S22.42XA MULTIPLE CLOSED FRACTURES OF RIBS OF LEFT SIDE: Status: ACTIVE | Noted: 2021-04-11

## 2021-04-21 PROCEDURE — 99225 SUBSEQUENT OBSERVATION CARE: CPT | Performed by: INTERNAL MEDICINE

## 2021-04-21 RX ORDER — SODIUM CHLORIDE 9 MG/ML
INJECTION, SOLUTION INTRAVENOUS CONTINUOUS
Status: ACTIVE | OUTPATIENT
Start: 2021-04-21 | End: 2021-04-21

## 2021-04-21 NOTE — PROGRESS NOTES
[facility]  Progress Note    Jagjit Power Patient Status:  Observation    1960 MRN [id number]   [facility] 2NE-A Attending Soledad Doty MD   Hosp Day # 0 PCP Bushra Felder MD     CC: Syncope/near syncope    SUBJECTIVE:  Melissa villar normal  Extremities: extremities normal,no cyanosis or edema  Pulses: 2+ and symmetric  Skin: Fading bruise in the left lower abdomen  Neurologic: Awake,alert,nonfocal  Psych: Mood and affect appears normal      Labs:   Lab Results   Component Value Date compared to the previous exam. Decreased presacral thickening now measuring up to 1.4 cm in thickness, previously 3.2 cm.    3. CAD with recent stent  1. Cardiology following  4. Recent fall at home with multiple left-sided closed rib fracture  1.  Incentiv

## 2021-04-21 NOTE — PLAN OF CARE
Alert and oriented x4. On RA. Denies any SOB or chest pain. C/o lightheadedness while standing. Was ortho (+) on admission. NSR on tele. Continent to bowel and bladder. Denies pain. Up with 2 assist x walker. Using bedside commode. Call light within reach.

## 2021-04-21 NOTE — PROGRESS NOTES
[facility] Cardiology Progress Note        Liliana Saji Patient Status:  Observation    1960 MRN [id number]   [facility] 2NE-A Attending Hodan Plaza MD   Hosp Day # 0 PCP Yeimi Calloway MD     Subjective:   Had 9. 6*  9.6*   < > 8.4* 8.2* 8.4* 8.6* 10.4*   .0*  --  124.0*  --   --   --   --   --  306.0    < > = values in this interval not displayed.        Chem:  Recent Labs   Lab 04/16/21  7729 04/17/21  0557 04/19/21  1632 04/20/21  0945 04/21/21  8404   N Shantel Cunningham  4/21/2021  7:43 AM

## 2021-04-21 NOTE — PROGRESS NOTES
Received from ER at approximately 14:30 alert oriented times four. Positive orthostatic blood pressure when standing. Pt states slightly lightheaded with standing.   Noted fading bruise on L flank area pt states still painful  5/10 tylenol given for com

## 2021-04-21 NOTE — PLAN OF CARE
Assumed patient care at 0730 this AM. Patient A&O x4, wears glasses. SPO2 maintained on RA, no c/o SOB. IS best effort 2500, continued use encouraged. NSR on tele. SCDs in place. Ortho positive on admit, per Dr. Dae Garay no need to continue taking orthos. pain and request assistance  - Assess pain using appropriate pain scale  - Administer analgesics based on type and severity of pain and evaluate response  - Implement non-pharmacological measures as appropriate and evaluate response  - Consider cultural an preferences of patient/family/discharge partner  - Complete POLST form as appropriate  - Assess patient's ability to be responsible for managing their own health  - Refer to Case Management Department for coordinating discharge planning if the patient need

## 2021-04-21 NOTE — PROGRESS NOTES
04/21/21 0440 04/21/21 0442 04/21/21 0444   Vital Signs   /62 109/70 (!) 83/36   MAP (mmHg) 73 79 45   BP Location Left arm  --   --    BP Method Automatic  --   --    Patient Position Lying Sitting Standing

## 2021-04-22 ENCOUNTER — APPOINTMENT (OUTPATIENT)
Dept: GENERAL RADIOLOGY | Facility: HOSPITAL | Age: 61
DRG: 312 | End: 2021-04-22
Attending: INTERNAL MEDICINE
Payer: COMMERCIAL

## 2021-04-22 PROCEDURE — 71045 X-RAY EXAM CHEST 1 VIEW: CPT | Performed by: INTERNAL MEDICINE

## 2021-04-22 PROCEDURE — 99291 CRITICAL CARE FIRST HOUR: CPT | Performed by: INTERNAL MEDICINE

## 2021-04-22 RX ORDER — ACETAMINOPHEN 325 MG/1
TABLET ORAL
Status: COMPLETED
Start: 2021-04-22 | End: 2021-04-22

## 2021-04-22 RX ORDER — METOPROLOL SUCCINATE 25 MG/1
25 TABLET, EXTENDED RELEASE ORAL
Status: DISCONTINUED | OUTPATIENT
Start: 2021-04-22 | End: 2021-04-23

## 2021-04-22 RX ORDER — DIPHENHYDRAMINE HYDROCHLORIDE 50 MG/ML
INJECTION INTRAMUSCULAR; INTRAVENOUS
Status: COMPLETED
Start: 2021-04-22 | End: 2021-04-22

## 2021-04-22 RX ORDER — FAMOTIDINE 10 MG/ML
INJECTION, SOLUTION INTRAVENOUS
Status: COMPLETED
Start: 2021-04-22 | End: 2021-04-22

## 2021-04-22 RX ORDER — POTASSIUM CHLORIDE 20 MEQ/1
40 TABLET, EXTENDED RELEASE ORAL ONCE
Status: COMPLETED | OUTPATIENT
Start: 2021-04-22 | End: 2021-04-22

## 2021-04-22 RX ORDER — HYDRALAZINE HYDROCHLORIDE 20 MG/ML
INJECTION INTRAMUSCULAR; INTRAVENOUS
Status: DISPENSED
Start: 2021-04-22 | End: 2021-04-23

## 2021-04-22 NOTE — PROGRESS NOTES
[facility]  Progress Note    Iris Mayer Patient Status:  Observation    1960 MRN [id number]   [facility] 2NE-A Attending Lenore Abdul MD   Hosp Day # 0 PCP Desirae Collier MD     CC: Syncope/near syncope    SUBJECTIVE:  Patient blood pressure back to normal 117/70 after the Benadryl.   Will also send out blood culture, CBC, CMP, magnesium, UA with reflex culture and will do chest x-ray to rule out other causes of rigors and to rule out any start of any  infection      Review of Sy 04/22/2021       Imaging:  Imaging reviewed in Epic.     Meds:   Amiodarone HCl (CORDARONE) 450 mg in dextrose 5 % 250 mL infusion, 1 mg/min, Intravenous, Continuous   Followed by  Amiodarone HCl (CORDARONE) 450 mg in dextrose 5 % 250 mL infusion, 0.5 mg/mi home with multiple left-sided closed rib fracture  1. Incentive spirometer  5. Iron deficiency anemia  1. s/p IV iron given on 4/21/2021  2. Patient takes iron sulfate oral at home  6. FREDA and hyponatremia on admission due to dehydration  1.  Improved with Jesse Duarte this morning.   Patient seen this a.m. from 8:59 AM to 9:30 AM.  Responded to rapid response and patient seen again during rapid response and, 35 minutes of critical care time spent during rapid response from 12:30 PM to 1:05 PM.  Discussed wit

## 2021-04-22 NOTE — PROGRESS NOTES
[facility] [address] [place] [address] Cardiology Progress Note        Hannah [facility] Patient Status:  Observation    1960 MRN [id number]   [facility] 2NE-A Attending Cm Israel MD   Hosp Day # 0 PCP Leonora Sim MD     Subjective:  Juan Ruelas 04/20/21  0945 04/21/21  1250 04/22/21  0706   WBC  --   --  9.9 9.5 10.3   HGB 8.4* 8.6* 10.4* 9.5* 10.2*   PLT  --   --  306.0 309.0 365.0       Chem:  Recent Labs   Lab 04/17/21  0557 04/19/21  1632 04/20/21  0945 04/21/21  0702 04/22/21  0706    back to sinus today. UA and procal negative.           Keaton Blanco

## 2021-04-22 NOTE — PROGRESS NOTES
Cardiology follow-up  Called to rapid response. Patient with history of syncope, orthostasis and PAF found to have coronary lesion and had stent placed. Patient had fall and had rib fractures and perinephric hematoma.   This admit, patient developed a. fi

## 2021-04-22 NOTE — CONSULTS
[facility]    Urology Consult Note    Mere Bates Patient Status:  Observation    1960 MRN [id number]   [facility] 2NE-A Attending Huma Lomeli MD   Hosp Day # 0 PCP Karla Roberts MD     Date of Admission:  2021  Date of REMOVAL OF TONSILS,12+ Y/O     • TONSILLECTOMY        Family History   Problem Relation Age of Onset   • Heart Disease Father    • Cancer Father    • Heart Disease Mother    • Hypertension Brother    • Cancer Brother       Social History: Social History CLARITY  --  Clear Clear   SPECGRAVITY 1.020 <=1.005 1.013   PHURINE 5.5 6.0 6.0   PROUR  --  Negative Negative   GLUUR NEG Negative Negative   KETUR  --  Negative Negative   BILUR  --  Negative Negative   BLOODURINE  --  Negative Negative   NITRITE NEG concerns.      Jesse Duarte MD, FACS  Urologist  [facility]    Date: 4/22/2021  Time: 8:16 AM

## 2021-04-22 NOTE — PLAN OF CARE
Patient alert and oriented x4. Was NSR, converted to Afib RVR at 2208, HR was sustaining 160s. MD notified, orders placed for cardizem bolus and gtt. Patient reports feeling \"off\", felt palpitations when ambulating to bathroom. On room air.  Patient denie administer replacement therapy as ordered  Outcome: Progressing     Problem: PAIN - ADULT  Goal: Verbalizes/displays adequate comfort level or patient's stated pain goal  Description: INTERVENTIONS:  - Encourage pt to monitor pain and request assistance  -

## 2021-04-23 ENCOUNTER — NURSE ONLY (OUTPATIENT)
Dept: ELECTROPHYSIOLOGY | Facility: HOSPITAL | Age: 61
DRG: 312 | End: 2021-04-23
Attending: INTERNAL MEDICINE
Payer: COMMERCIAL

## 2021-04-23 PROBLEM — I48.91 ATRIAL FIBRILLATION WITH RVR (HCC): Status: ACTIVE | Noted: 2021-04-11

## 2021-04-23 PROCEDURE — 99223 1ST HOSP IP/OBS HIGH 75: CPT | Performed by: OTHER

## 2021-04-23 PROCEDURE — 95816 EEG AWAKE AND DROWSY: CPT | Performed by: OTHER

## 2021-04-23 PROCEDURE — 99232 SBSQ HOSP IP/OBS MODERATE 35: CPT | Performed by: INTERNAL MEDICINE

## 2021-04-23 RX ORDER — METOPROLOL SUCCINATE 50 MG/1
50 TABLET, EXTENDED RELEASE ORAL
Status: DISCONTINUED | OUTPATIENT
Start: 2021-04-23 | End: 2021-04-23

## 2021-04-23 RX ORDER — METOPROLOL TARTRATE 5 MG/5ML
5 INJECTION INTRAVENOUS EVERY 4 HOURS PRN
Status: DISCONTINUED | OUTPATIENT
Start: 2021-04-23 | End: 2021-04-25

## 2021-04-23 RX ORDER — SOTALOL HYDROCHLORIDE 120 MG/1
120 TABLET ORAL EVERY 12 HOURS SCHEDULED
Status: DISCONTINUED | OUTPATIENT
Start: 2021-04-23 | End: 2021-04-25

## 2021-04-23 RX ORDER — MIDODRINE HYDROCHLORIDE 5 MG/1
5 TABLET ORAL 3 TIMES DAILY
Status: DISCONTINUED | OUTPATIENT
Start: 2021-04-23 | End: 2021-04-24

## 2021-04-23 RX ORDER — METOPROLOL TARTRATE 5 MG/5ML
5 INJECTION INTRAVENOUS ONCE
Status: COMPLETED | OUTPATIENT
Start: 2021-04-23 | End: 2021-04-23

## 2021-04-23 RX ORDER — METOPROLOL SUCCINATE 25 MG/1
25 TABLET, EXTENDED RELEASE ORAL ONCE
Status: COMPLETED | OUTPATIENT
Start: 2021-04-23 | End: 2021-04-23

## 2021-04-23 NOTE — PROGRESS NOTES
[facility]  Progress Note    Arvfranchesca Cool Patient Status:  Observation    1960 MRN [id number]   [facility] 2NE-A Attending Beck Viera MD   Hosp Day # 1 PCP Alfonso Reynolds MD     CC: Syncope/near syncope    SUBJECTIVE:  Patient Encounters:  04/20/21 : (!) 408 lb 4.7 oz (185.2 kg)  04/16/21 : 201 lb 8 oz (91.4 kg)  11/08/19 : 185 lb (83.9 kg)        Physical Exam:     /65 (BP Location: Left arm)   Pulse (!) 145   Temp 98.4 °F (36.9 °C) (Oral)   Resp 18   Ht 5' 11\" (1.803 m) atelectasis or scarring again identified.  There is no new focal infiltrate, consolidation or pulmonary edema.  There is mild blunting the    left costophrenic angle again noted which is stable and may reflect a small left effusion or pleural thickening. for orthostatic hypotension, expect some dilutional anemia  3.  CT of the chest and abdomen done on 4/20/2021 shows Redemonstration of a subcapsular hematoma surrounding the left kidney extending into the left pararenal space and left side of the retroperit again on a.m. of 4/23/2021 which was 0.595, possibly related to tachycardia from A. fib with RVR per cardiology. 4.  blood culture pending  5. UA negative.   Patient had a procalcitonin done on 4/21/2021 which was negative at less than 0.05.   6. Patient w

## 2021-04-23 NOTE — PROGRESS NOTES
[facility]    Progress Note    Ro Carias Patient Status:  Inpatient    1960 MRN [id number]   [facility] ([place], [place] 2NE-A Attending Luz Maria Miller MD   Hosp Day # 1 PCP Rani Crowe MD        Subjective: Went into RVR this morning.  Pos compared with ECG of 20-APR-2021 09:41, Atrial fibrillation has replaced Sinus rhythm Vent.  rate has increased BY  76 BPM Confirmed by Peter Jacobs M.D., [facility] ([id number]) on 4/23/2021 8:56:45 AM    EKG 12-LEAD    Result Date: 4/22/2021  Normal sinus rhythm Normal EC

## 2021-04-23 NOTE — PROGRESS NOTES
[facility] Progress Note        Mariano Calix Patient Status:  Observation    1960 MRN [id number]   [facility] 2NE-A Attending Yovanny Quigley MD   Hosp Day # 1 PCP Edgar Michael MD     Subjective:  Kosta wall Cath:      Labs:  HEM:  Recent Labs   Lab 04/20/21  0945 04/21/21  1250 04/22/21  0706 04/22/21  1257 04/23/21  0555   WBC 9.9 9.5 10.3 11.7* 9.6   HGB 10.4* 9.5* 10.2* 10.4* 10.3*   .0 309.0 365.0 364.0 388. 0       Chem:  Recent Labs   Lab 04/19/21 LAD on 4/13/21.     5. ARF - resolved with fluids. 6. Mixed HL      Plan:  No clear cause for reaction to IV amio 4/22, but some type of allergic reaction. .. Will have EP revisit. May shower and be in chair.   Continuing plavix alone due to bleeding ri

## 2021-04-23 NOTE — PLAN OF CARE
Pt Aox4, RA, no stated pain. + orthostatic, dropped significantly when going from sitting to standing. Pt stated he felt like he blacked out. Pt given bolus. EEG completed and neuro put on consult/called to be informed. Midodrine added.     Problem: Patient Encourage pt to monitor pain and request assistance  - Assess pain using appropriate pain scale  - Administer analgesics based on type and severity of pain and evaluate response  - Implement non-pharmacological measures as appropriate and evaluate response

## 2021-04-23 NOTE — PLAN OF CARE
Assumed care for pt at 19:30 on 4/22    A&Ox4. Denies pain. VSS on RA. NSR on tele. Continent of B&B, up with standby. UA sent.   Bilat SCDs on for VTE prophlyaxis    Plan: Trend Hgb, monitor for signs of bleed, AM labs, daily weight    Pt aware Bed alarm i comfort level or patient's stated pain goal  Description: INTERVENTIONS:  - Encourage pt to monitor pain and request assistance  - Assess pain using appropriate pain scale  - Administer analgesics based on type and severity of pain and evaluate response  - care, etc)  - Arrange for interpreters to assist at discharge as needed  - Consider post-discharge preferences of patient/family/discharge partner  - Complete POLST form as appropriate  - Assess patient's ability to be responsible for managing their own he

## 2021-04-23 NOTE — CONSULTS
[facility] Electrophysiology Consult      Lala Cool Patient Status:  Inpatient    1960 MRN [id number]   [facility] [place] 2NE-A Attending Beck Viera MD   [facility] Day # 1 PCP MD Lala Jang is a 62 year ol encounter. Multiple Vitamins-Minerals (TAB-A-SOPHY) Oral Tab, Take 1 tablet by mouth daily. , Disp: , Rfl:   ascorbic acid, VITAMIN C, 250 MG Oral Tab, Take 250 mg by mouth daily. , Disp: , Rfl:   metoprolol Tartrate 25 MG Oral Tab, Take 1 tablet (25 mg tota 56.95 kg/m²      Intake/Output Summary (Last 24 hours) at 4/23/2021 1259  Last data filed at 4/23/2021 1153  Gross per 24 hour   Intake 120 ml   Output 1 ml   Net 119 ml     Wt Readings from Last 4 Encounters:  04/20/21 : (!) 408 lb 4.7 oz (185.2 kg)  04/1 B7, vitamin H, coenzyme R) supplements resulting in serum concentrations >100 ng/mL.   Intake of the recommended daily allowance (RDA) for biotin (0.03 mg) has not been shown to typically cause significant interference; however, high dose daily dietary supp

## 2021-04-23 NOTE — CONSULTS
[id number] [facility] neurology initial consultation     Kierra Zheng Patient Status:  Inpatient    1960 MRN [id number]   [facility] 2NE-A Attending Lona Martinez MD   Hosp Day # 1 PCP Ramya Garcia MD     Reason for Consultation:  sy Medical History:   Diagnosis Date   • Atherosclerosis of coronary artery    • Back problem    • BPH (benign prostatic hyperplasia)    • Coronary atherosclerosis    • Dizziness    • Epileptic [facility])     Epileptic episodes   • Hearing impairment    • High cho Tab EC, Take 1 tablet (325 mg total) by mouth 2 (two) times daily with meals. , Disp: 60 tablet, Rfl: 0, 4/20/2021 at 0900  atorvastatin 80 MG Oral Tab, Take 1 tablet (80 mg total) by mouth nightly., Disp: 90 tablet, Rfl: 3, 4/19/2021 at 2000  Clopidogrel B carotid bruits      Neuro:  Mental status:  Orientation: Alert and oriented to person, place, time  Naming, repetition and comprehension intact  Memory: normal  Attention/concentration: normal    CN: PERRL, EOMI without nystagmus, VFF, smile symmetric, sen abnormal due to intermittent focal slowing over the left temporal region. This may suggest neuronal structural pathology. No defnitive epileptiform activity, or clinical or electrographic seizures were noted on this awake and drowsy recording.  Clinical co

## 2021-04-23 NOTE — PROCEDURES
[address] EEG report    Name: Bonnie Feldman    Date of Study: 4/23/2021      Routine EEG Report    Ordering Physician: Hali Parsons MD                             Primary Care Physician: Gracieal Ramirez MD    Technical Aspects Amoxicillin-Pot Clavulanate (AUGMENTIN) 875-125 MG tab 1 tablet, 1 tablet, Oral, BID  atorvastatin (LIPITOR) tab 80 mg, 80 mg, Oral, Nightly  Clopidogrel Bisulfate (PLAVIX) tab 75 mg, 75 mg, Oral, Daily  ferrous sulfate EC tab 325 mg, 325 mg, Oral, BID wit [phone]  4/23/2021

## 2021-04-23 NOTE — PLAN OF CARE
03:47 converted to afib rates 120-140s while laying in bed, asymptomatic. /87. Dr. Sonido Vann notified, increased metoprolol succinate to 50mg bid and gave one time dose of 5mg IV hydralazine. Rates . Will continue to monitor.

## 2021-04-24 ENCOUNTER — APPOINTMENT (OUTPATIENT)
Dept: MRI IMAGING | Facility: HOSPITAL | Age: 61
DRG: 312 | End: 2021-04-24
Attending: Other
Payer: COMMERCIAL

## 2021-04-24 PROCEDURE — 99232 SBSQ HOSP IP/OBS MODERATE 35: CPT | Performed by: INTERNAL MEDICINE

## 2021-04-24 PROCEDURE — 70546 MR ANGIOGRAPH HEAD W/O&W/DYE: CPT | Performed by: OTHER

## 2021-04-24 PROCEDURE — 70549 MR ANGIOGRAPH NECK W/O&W/DYE: CPT | Performed by: OTHER

## 2021-04-24 PROCEDURE — 99233 SBSQ HOSP IP/OBS HIGH 50: CPT | Performed by: OTHER

## 2021-04-24 PROCEDURE — 70553 MRI BRAIN STEM W/O & W/DYE: CPT | Performed by: OTHER

## 2021-04-24 RX ORDER — MIDODRINE HYDROCHLORIDE 5 MG/1
10 TABLET ORAL 3 TIMES DAILY
Status: DISCONTINUED | OUTPATIENT
Start: 2021-04-25 | End: 2021-04-25

## 2021-04-24 RX ORDER — LORAZEPAM 2 MG/ML
0.5 INJECTION INTRAMUSCULAR ONCE
Status: COMPLETED | OUTPATIENT
Start: 2021-04-24 | End: 2021-04-24

## 2021-04-24 RX ORDER — LORAZEPAM 2 MG/ML
1 INJECTION INTRAMUSCULAR ONCE
Status: COMPLETED | OUTPATIENT
Start: 2021-04-24 | End: 2021-04-24

## 2021-04-24 NOTE — PLAN OF CARE
Pt AOx4, RA; easy breathing, no stated pain. Pt in NSR since assuming care this AM. Pt scheduled for MRI in AM (ativan given per request). Per Dr. Lisandra Corcoran, it was not necessary to interrogate linq prior to going down to the MRI.  Orthostatic +, but better Progressing     Problem: PAIN - ADULT  Goal: Verbalizes/displays adequate comfort level or patient's stated pain goal  Description: INTERVENTIONS:  - Encourage pt to monitor pain and request assistance  - Assess pain using appropriate pain scale  - Adminis transportation as appropriate  - Identify discharge learning needs (meds, wound care, etc)  - Arrange for interpreters to assist at discharge as needed  - Consider post-discharge preferences of patient/family/discharge partner  - Complete POLST form as lucila

## 2021-04-24 NOTE — PLAN OF CARE
Assumed care of patient 4/23/21 1930. Seizure precautions initiated d/t hx of \"epilepsy\" and recent tremor/rigor episode. Pt here ortho+ w/syncope and falls, recent stent and LINQ device placed. In/out of AFIB, now awaiting MRI of brain.  MRI will be done Monitor arterial and/or venous puncture sites for bleeding and/or hematoma  - Assess quality of pulses, skin color and temperature  - Assess for signs of decreased coronary artery perfusion - ex.  Angina  - Evaluate fluid balance, assess for edema, trend we pt frequently for physical needs  - Identify cognitive and physical deficits and behaviors that affect risk of falls.   - [place] fall precautions as indicated by assessment.  - Educate pt/family on patient safety including physical limitations  - Instruc

## 2021-04-24 NOTE — PROGRESS NOTES
[facility]  Progress Note    Sherine Brody Patient Status:  Observation    1960 MRN [id number]   [facility] 2NE-A Attending Patricia Banerjee MD   Hosp Day # 2 PCP Janeth Metcalf MD     CC: Syncope/near syncope    SUBJECTIVE:  MRI bra .0 04/24/2021    CREATSERUM 1.14 04/24/2021    BUN 14 04/24/2021     04/24/2021    K 3.9 04/24/2021     04/24/2021    CO2 22.0 04/24/2021    GLU 99 04/24/2021    CA 8.6 04/24/2021       Imaging:  XR CHEST AP PORTABLE  (CPT=71045)       T Midodrin  2. Fall precautions  3. Seen by cardiology  4. Seen by neurology. 5. MRI of the brain unremarkable  6. Started on Midodrin for orthostatic hypotension by cardiology on 4/23/2021  2. Recent retroperitoneal bleed, perinephric hematoma; Recent fall afternoon noted that patient reverted back to normal sinus rhythm. EKG done on 4/22/2021 afternoon which shows normal sinus rhythm. Cardizem drip stopped on 4/22/2021 afternoon.   3. Mild troponin elevation, cardiology team aware, had discussed with Dr. Manoj Chandler

## 2021-04-24 NOTE — PROGRESS NOTES
[id number] [facility] Neurology Progress Note    Andrew Peralta Patient Status:  Inpatient    1960 MRN [id number]   [facility] 2NE-A Attending Terry Taylor MD   [facility] Day # 2 PCP Vicenta Hoover MD     Subjective:  Andrew Peralta is a(n) normal S1/S2, regular rate and rhythm  Lungs: clear to auscultation bilaterally      Neuro:  Mental status:  Orientation: Alert and oriented to person, place, time  Speech fluent and conversational     CN: PERRL, EOMI without nystagmus, VFF, smile symmetri AST 36 04/22/2021    AST 42 (H) 04/22/2021    AST 25 04/20/2021     Lab Results   Component Value Date    ALT 43 04/22/2021    ALT 45 04/22/2021    ALT 35 04/20/2021       Imaging:   MRI BRAIN MRA HEAD+MRA NECK (ALL W+WO) (CPT=70553/21024/53606)    Result showed no acute stroke; as noted above  - EEG as above     Patient overall doing well; will sign off; advised to follow up in clinic to reassess for recurrent spells    35 total minutes spent, including review of chart, imaging with patient with discussion

## 2021-04-24 NOTE — PROGRESS NOTES
[facility] Progress Note        Stacy Walker Patient Status:  Observation    1960 MRN [id number]   [facility] 2NE-A Attending Pamela Valenzuela MD   Hosp Day # 2 PCP Anders Boucher MD     Subjective:   Danika Cath:      Labs:  HEM:  Recent Labs   Lab 04/21/21  1250 04/22/21  0706 04/22/21  1257 04/23/21  0555 04/24/21  0549   WBC 9.5 10.3 11.7* 9.6 9.4   HGB 9.5* 10.2* 10.4* 10.3* 9.7*   .0 365.0 364.0 388.0 394.0       Chem:  Recent Labs   Lab 04/21/21 of LAD on 4/13/21.     5. ARF - resolved with fluids. 6. Mixed HL    7. ?Seizure d/o - abnormal EEG. Neuro notes read. MRI today. Plan:  No clear cause for reaction to IV amio 4/22, but some type of allergic reaction. ..     May shower and be in ch

## 2021-04-25 ENCOUNTER — APPOINTMENT (OUTPATIENT)
Dept: CT IMAGING | Facility: HOSPITAL | Age: 61
DRG: 312 | End: 2021-04-25
Attending: INTERNAL MEDICINE
Payer: COMMERCIAL

## 2021-04-25 ENCOUNTER — APPOINTMENT (OUTPATIENT)
Dept: ULTRASOUND IMAGING | Facility: HOSPITAL | Age: 61
DRG: 312 | End: 2021-04-25
Attending: HOSPITALIST
Payer: COMMERCIAL

## 2021-04-25 VITALS
HEART RATE: 64 BPM | BODY MASS INDEX: 26.06 KG/M2 | SYSTOLIC BLOOD PRESSURE: 115 MMHG | RESPIRATION RATE: 16 BRPM | DIASTOLIC BLOOD PRESSURE: 64 MMHG | OXYGEN SATURATION: 99 % | TEMPERATURE: 99 F | WEIGHT: 186.19 LBS | HEIGHT: 71 IN

## 2021-04-25 PROCEDURE — 74176 CT ABD & PELVIS W/O CONTRAST: CPT | Performed by: INTERNAL MEDICINE

## 2021-04-25 PROCEDURE — 99239 HOSP IP/OBS DSCHRG MGMT >30: CPT | Performed by: INTERNAL MEDICINE

## 2021-04-25 PROCEDURE — 93971 EXTREMITY STUDY: CPT | Performed by: HOSPITALIST

## 2021-04-25 RX ORDER — MIDODRINE HYDROCHLORIDE 10 MG/1
10 TABLET ORAL 3 TIMES DAILY
Qty: 90 TABLET | Refills: 1 | Status: SHIPPED | OUTPATIENT
Start: 2021-04-25 | End: 2021-05-12

## 2021-04-25 RX ORDER — SOTALOL HYDROCHLORIDE 120 MG/1
120 TABLET ORAL EVERY 12 HOURS SCHEDULED
Qty: 60 TABLET | Refills: 1 | Status: SHIPPED | OUTPATIENT
Start: 2021-04-25 | End: 2021-05-12

## 2021-04-25 NOTE — PLAN OF CARE
Tele alarm this AM approx 0630 for prolong QTC on monitor. Computer measuring QTC at > 600, my measurement shows 500. EKG done, QTC at 462. Bassam WIGGINS notified. Will continue to monitor closely.

## 2021-04-25 NOTE — DISCHARGE SUMMARY
[facility]  Discharge Summary    Ayush Coleman Patient Status:  Inpatient    1960 MRN [id number]   [facility] 2NE-A Attending No att. providers found   Hosp Day # 3 PCP Carmen Casey MD     Date of Admission: 2021    Date of overnight on 4/21/2021 into 4/22/2021 and started on Cardizem drip.   Seen by urologist and cardiologist in hospital.urologist advised to minimize on any medications which can potentially thin the blood due to the renal hematoma, patient status post stent a with RVR around 4 AM on 4/23/2021. Was given IV metoprolol at this time and oral metoprolol increased by cardiologist at that time.   Patient with persistent symptomatic orthostatic hypotension even after IV fluids, started on midodrine by cardiologist  Consulting Physician  UROLOGY    Reji Graves MD/Mulugeta Corea MD Consulting Physician  Cardiovascular Diseases            Procedures during hospitalization:   · None    Incidental or significant findings and recommendations (brief descriptions): known as: Shanique Page  Notes to patient: Pain medication      Take 1 tablet (10 mg total) by mouth every 4 (four) hours as needed. Quantity: 30 tablet  Refills: 0     Tab-A-Gaurav Tabs  Notes to patient: multivitamin      Take 1 tablet by mouth daily.    Ref Ht 5' 11\" (1.803 m)   Wt 186 lb 3.2 oz (84.5 kg)   SpO2 99%   BMI 25.97 kg/m²     General appearance: alert and cooperative  Head: Normocephalic, without obvious abnormality, atraumatic  Throat: oral mucosa moist  Neck: no adenopathy, no carotid bruit, no

## 2021-04-25 NOTE — PROGRESS NOTES
04/25/21 1713 04/25/21 1714 04/25/21 1715   Vital Signs   Pulse 53 66 57   /66  --  124/66   MAP (mmHg) 77  --  79   Patient Position Lying  --  Sitting      04/25/21 1716   Vital Signs   Pulse 64   /64   MAP (mmHg) 77   Patient Position Sta

## 2021-04-25 NOTE — PROGRESS NOTES
Called by RN patient with LUE swelling  Plan:  Check LUE venous doppler - ordered  Discussed with RT  Pardeep POLANCO

## 2021-04-25 NOTE — PLAN OF CARE
Patient was given discharge instructions in regards to medications, s/s to monitor for, follow up appointments, and activity level. All questions were answered. IV was removed. Patient was removed from tele. Patient was escorted off the unit by transport. ordered  - Initiate emergency measures for life threatening arrhythmias  - Monitor electrolytes and administer replacement therapy as ordered  4/25/2021 1759 by Zora Samuel RN  Outcome: Completed  4/25/2021 1217 by Zora Samuel RN  Outcome: Progress devices as appropriate  - Consider OT/PT consult to assist with strengthening/mobility  - Encourage toileting schedule  4/25/2021 1759 by Quintin Mckeon, RN  Outcome: Completed  4/25/2021 1217 by Quintin Mckeon, RN  Outcome: Progressing     Problem: Boss Phlegm

## 2021-04-25 NOTE — PLAN OF CARE
Pt remains alert, oriented x4, looks pale today. Depressed about current state of health, eager to go home. Continues to have low grade temps at night, Tmax 100.3. Has been receiving tylenol for (L) side pain. Lungs CTA. No sob. Remains in NSR.  Continues o decreased coronary artery perfusion - ex.  Angina  - Evaluate fluid balance, assess for edema, trend weights  Outcome: Progressing  Goal: Absence of cardiac arrhythmias or at baseline  Description: INTERVENTIONS:  - Continuous cardiac monitoring, monitor vi based on assessment  - Modify environment to reduce risk of injury  - Provide assistive devices as appropriate  - Consider OT/PT consult to assist with strengthening/mobility  - Encourage toileting schedule  Outcome: Progressing     Problem: DISCHARGE PLAN

## 2021-04-25 NOTE — PROGRESS NOTES
[facility]  Progress Note    Mercedez Harris Patient Status:  Observation    1960 MRN [id number]   [facility] 2NE-A Attending Noemi Dickens MD   Hosp Day # 3 PCP Jaylon Bernardo MD     CC: Syncope/near syncope    SUBJECTIVE:  Patient edema  Pulses: 2+ and symmetric  Skin: Fading bruise in the left lower abdomen  Neurologic: Awake,alert,nonfocal  Psych: Mood and affect appears normal      Labs:   Lab Results   Component Value Date    WBC 9.8 04/25/2021    HGB 10.2 04/25/2021    HCT 30. 0 Q6H PRN  ondansetron HCl (ZOFRAN) injection 4 mg, 4 mg, Intravenous, Q6H PRN        ASSESSMENT / PLAN:     1. Syncope and dizziness and presyncope due to orthostatic hypotension  1.  Orthostatic hypotension persist, improving from yesterday with IV fluids a and advance diet as tolerated  3. We will check stool studies if patient develops any diarrhea  4. Covid screening negative on admission  5. White count normal  8.  A. fib with RVR with mild troponin elevation; rigors on 4/22/2021 due to possible allergic r urology as directed. Follow-up with regular outpatient primary care physician Vernell Hewitt MD within 1 week in office    Brenna Mae MD  4/25/2021      Addendum 5:55 PM  Improved orthostatics blood pressure this evening as reported by RN.   CT of the ab

## 2021-04-25 NOTE — PROGRESS NOTES
[facility] [address] [place] [address] Cardiology Progress Note        Loly Oleary Patient Status:  Observation    1960 MRN [id number]   [facility] 2NE-A Attending Bigg Nieto MD   Hosp Day # 3 PCP Jyothi Collins MD     Subjective:    PV Cath:      Labs:  HEM:  Recent Labs   Lab 04/21/21  1250 04/22/21  0706 04/22/21  1257 04/23/21  0555 04/24/21  0549   WBC 9.5 10.3 11.7* 9.6 9.4   HGB 9.5* 10.2* 10.4* 10.3* 9.7*   .0 365.0 364.0 388.0 394.0       Chem:  Recent Labs   Lab 04/21/21 FFR.  -PCI of LAD on 4/13/21.     5. ARF - resolved with fluids. 6. Mixed HL    7. ?Seizure d/o - abnormal EEG. Neuro notes read. MRI normal.      Plan:  No clear cause for reaction to IV amio 4/22, but some type of allergic reaction. ..     Continue mi

## 2021-04-25 NOTE — PLAN OF CARE
Patient is alert and oriented x4. NSR on tele. Oxygenation is 100% on RA. L sounds clear. VSS. Patient denies chest pain, shortness of breath, palpitations. Denies pain. Patient is resting comfortably in bed at this time.     POC: ultrasound of L arm, ct no Problem: PAIN - ADULT  Goal: Verbalizes/displays adequate comfort level or patient's stated pain goal  Description: INTERVENTIONS:  - Encourage pt to monitor pain and request assistance  - Assess pain using appropriate pain scale  - Administer analgesics appropriate  - Identify discharge learning needs (meds, wound care, etc)  - Arrange for interpreters to assist at discharge as needed  - Consider post-discharge preferences of patient/family/discharge partner  - Complete POLST form as appropriate  - Assess

## 2021-04-25 NOTE — PROGRESS NOTES
04/25/21 0423 04/25/21 0424 04/25/21 0425   Vital Signs   Temp 98.4 °F (36.9 °C)  --   --    Temp src Oral  --   --    Pulse 61 62 68   Heart Rate Source Monitor  --   --    Resp 16  --   --    Respiratory Quality Normal  --   --    /67 113/67 109

## 2021-05-18 NOTE — PROGRESS NOTES
HPI:     Hannah Hollingsworth is a 62year old male with a PMH of epilepsy, HL, CAD, LBP with left sided sciatica, CAD s/p NEELA 4/12/21. PCP Mohsen Shrestha I am following for:  1.  Left perinephric hematoma  - Admitted 4/11 with syncope, rib fractures and subeque abdomen/pelvis with and without IVC imaging sometime within the week. Should avoid lifting over 5-10 lbs for at least the next 3 months given the substantial bleed and high risk for rebleeding while on plavix. Observation for BPH.  Continue testis pain prev an athletic supporter and try to avoid activities which exacerbate pain such as prolonged sitting or heavy lifting, try hot baths/hot packs. We also discussed a two week course of abx and he would like to do this.     I strongly recommend he be evaluated ag Sotalol HCl 80 MG Oral Tab Take 1 tablet (80 mg total) by mouth 2 (two) times daily. 60 tablet 11   • Coenzyme Q10 100 MG Oral Cap Take by mouth daily. • Midodrine HCl 10 MG Oral Tab Take 1 tablet (10 mg total) by mouth 3 (three) times daily.  180 table next 3 months given the substantial bleed and high risk for rebleeding while on plavix. Observation for BPH. Continue testis pain prevention strategies as discussed LOV. F/u in 3 mo or sooner if issues arise.     Graciela Tejeda MD, FACS  Urologist  [facility]

## 2021-05-24 ENCOUNTER — OFFICE VISIT (OUTPATIENT)
Dept: SURGERY | Facility: CLINIC | Age: 61
End: 2021-05-24
Payer: COMMERCIAL

## 2021-05-24 ENCOUNTER — LAB ENCOUNTER (OUTPATIENT)
Dept: LAB | Facility: HOSPITAL | Age: 61
End: 2021-05-24
Attending: UROLOGY
Payer: COMMERCIAL

## 2021-05-24 VITALS — DIASTOLIC BLOOD PRESSURE: 83 MMHG | HEART RATE: 45 BPM | SYSTOLIC BLOOD PRESSURE: 155 MMHG

## 2021-05-24 DIAGNOSIS — N50.812 PAIN IN LEFT TESTICLE: ICD-10-CM

## 2021-05-24 DIAGNOSIS — N40.1 BPH WITH OBSTRUCTION/LOWER URINARY TRACT SYMPTOMS: ICD-10-CM

## 2021-05-24 DIAGNOSIS — S37.019A PERINEPHRIC HEMATOMA: ICD-10-CM

## 2021-05-24 DIAGNOSIS — N13.8 BPH WITH OBSTRUCTION/LOWER URINARY TRACT SYMPTOMS: ICD-10-CM

## 2021-05-24 DIAGNOSIS — S37.019A PERINEPHRIC HEMATOMA: Primary | ICD-10-CM

## 2021-05-24 PROCEDURE — 99214 OFFICE O/P EST MOD 30 MIN: CPT | Performed by: UROLOGY

## 2021-05-24 PROCEDURE — 3079F DIAST BP 80-89 MM HG: CPT | Performed by: UROLOGY

## 2021-05-24 PROCEDURE — 80048 BASIC METABOLIC PNL TOTAL CA: CPT

## 2021-05-24 PROCEDURE — 81003 URINALYSIS AUTO W/O SCOPE: CPT | Performed by: UROLOGY

## 2021-05-24 PROCEDURE — 3077F SYST BP >= 140 MM HG: CPT | Performed by: UROLOGY

## 2021-05-24 PROCEDURE — 36415 COLL VENOUS BLD VENIPUNCTURE: CPT | Performed by: UROLOGY

## 2021-05-24 PROCEDURE — 85027 COMPLETE CBC AUTOMATED: CPT | Performed by: UROLOGY

## 2021-05-25 NOTE — PROGRESS NOTES
Results reviewed. His labs shows no significant abnormalities other than slightly elevated kidney function. Recommend he continue to drink plenty of fluids as we discussed in the office and maintain light activity for the next few months.  Continue to follo

## 2021-05-26 ENCOUNTER — TELEPHONE (OUTPATIENT)
Dept: SURGERY | Facility: CLINIC | Age: 61
End: 2021-05-26

## 2021-05-26 NOTE — TELEPHONE ENCOUNTER
Ok per Hippa form, detailed message left on identified vm of message below. He will call back with any questions.          ----- Message from Tonie Esteves MD sent at 5/25/2021  7:49 AM CDT -----  Results reviewed.  His labs shows no significant abnormalit

## 2021-06-02 ENCOUNTER — HOSPITAL ENCOUNTER (OUTPATIENT)
Dept: CT IMAGING | Facility: HOSPITAL | Age: 61
Discharge: HOME | End: 2021-06-02
Attending: UROLOGY
Payer: COMMERCIAL

## 2021-06-02 DIAGNOSIS — S37.019A PERINEPHRIC HEMATOMA: ICD-10-CM

## 2021-06-02 PROCEDURE — 74178 CT ABD&PLV WO CNTR FLWD CNTR: CPT | Performed by: UROLOGY

## 2021-06-03 NOTE — PROGRESS NOTES
Images reviewed. Please inform patient his CT shows the hematoma is slowly improving. No changes to plan as we discussed in the office.     Thanks,  MPH

## 2021-06-04 ENCOUNTER — TELEPHONE (OUTPATIENT)
Dept: SURGERY | Facility: CLINIC | Age: 61
End: 2021-06-04

## 2021-06-04 NOTE — TELEPHONE ENCOUNTER
Detailed message on identified vm with CT results and Dr Madeline Crowder message. Will call back with questions.        ----- Message from Danni Damon MD sent at 6/3/2021 11:52 AM CDT -----  Images reviewed.  Please inform patient his CT shows the hematoma is slo

## 2021-06-16 ENCOUNTER — APPOINTMENT (OUTPATIENT)
Dept: GENERAL RADIOLOGY | Facility: HOSPITAL | Age: 61
End: 2021-06-16
Payer: COMMERCIAL

## 2021-06-16 ENCOUNTER — HOSPITAL ENCOUNTER (EMERGENCY)
Facility: HOSPITAL | Age: 61
Discharge: HOME | End: 2021-06-16
Attending: EMERGENCY MEDICINE
Payer: COMMERCIAL

## 2021-06-16 VITALS
DIASTOLIC BLOOD PRESSURE: 75 MMHG | BODY MASS INDEX: 26 KG/M2 | SYSTOLIC BLOOD PRESSURE: 116 MMHG | TEMPERATURE: 98 F | HEART RATE: 75 BPM | OXYGEN SATURATION: 100 % | RESPIRATION RATE: 18 BRPM | WEIGHT: 188 LBS

## 2021-06-16 DIAGNOSIS — I48.91 ATRIAL FIBRILLATION WITH CONTROLLED VENTRICULAR RESPONSE (HCC): Primary | ICD-10-CM

## 2021-06-16 PROCEDURE — 93005 ELECTROCARDIOGRAM TRACING: CPT

## 2021-06-16 PROCEDURE — 99284 EMERGENCY DEPT VISIT MOD MDM: CPT

## 2021-06-16 PROCEDURE — 71045 X-RAY EXAM CHEST 1 VIEW: CPT | Performed by: EMERGENCY MEDICINE

## 2021-06-16 PROCEDURE — 84484 ASSAY OF TROPONIN QUANT: CPT | Performed by: EMERGENCY MEDICINE

## 2021-06-16 PROCEDURE — 93010 ELECTROCARDIOGRAM REPORT: CPT

## 2021-06-16 PROCEDURE — 36415 COLL VENOUS BLD VENIPUNCTURE: CPT

## 2021-06-16 PROCEDURE — 85025 COMPLETE CBC W/AUTO DIFF WBC: CPT | Performed by: EMERGENCY MEDICINE

## 2021-06-16 PROCEDURE — 85025 COMPLETE CBC W/AUTO DIFF WBC: CPT

## 2021-06-16 PROCEDURE — 80053 COMPREHEN METABOLIC PANEL: CPT | Performed by: EMERGENCY MEDICINE

## 2021-06-16 PROCEDURE — 80053 COMPREHEN METABOLIC PANEL: CPT

## 2021-06-16 PROCEDURE — 84484 ASSAY OF TROPONIN QUANT: CPT

## 2021-06-17 NOTE — ED INITIAL ASSESSMENT (HPI)
61YM c/c of arrhthymia Pt state that he feels that he been going and out of afib since Monday Pt state that he here tonight because he not coming out of it no diarrhea/no vomiting

## 2021-06-17 NOTE — ED PROVIDER NOTES
Patient Seen in: [facility] Emergency Department      History   Patient presents with:  Arrythmia/Palpitations    Stated Complaint: afib     HPI/Subjective:   HPI    The patient is a 62-year-old male who was diagnosed with atrial fibrillation in Apr Visual impairment               Past Surgical History:   Procedure Laterality Date   • ANGIOPLASTY (CORONARY)     • CATH DRUG ELUTING STENT     • HERNIA SURGERY     • OTHER      reattachment tendon elbow bilaterl   • REMOVAL OF TONSILS,12+ Y/O     • TONSIL and symmetric. No calf swelling, asymmetry, tenderness or cords. Skin: No masses or nodules or abnormalities.   Psych: Normal interaction, cooperative with exam       ED Course     Labs Reviewed   COMP METABOLIC PANEL (14) - Abnormal; Notable for the foll 12:52 PM.  EDWARD , XR, XR CHEST AP PORTABLE  (CPT=71045), 4/20/2021, 9:57     AM.         INDICATIONS:  afib         PATIENT STATED HISTORY: (As transcribed by Technologist)  Patient states     here for afib.                FINDINGS:  The heart size is Prescribed:  Current Discharge Medication List

## 2021-07-20 ENCOUNTER — TELEPHONE (OUTPATIENT)
Dept: SURGERY | Facility: CLINIC | Age: 61
End: 2021-07-20

## 2021-07-20 NOTE — TELEPHONE ENCOUNTER
Patient of Dr Sanderson Abts since Oct 2020. Recently seen on 5/24/21 after being hospitalized due to A Fib and perinephric hematoma. He reports no problem with Dr Barbara lara, but he was rescheduled and eventually was cancelled.  Patient said, he works in the [employer]

## 2021-08-02 ENCOUNTER — OFFICE VISIT (OUTPATIENT)
Dept: SURGERY | Facility: CLINIC | Age: 61
End: 2021-08-02
Payer: COMMERCIAL

## 2021-08-02 DIAGNOSIS — N40.1 BPH WITH OBSTRUCTION/LOWER URINARY TRACT SYMPTOMS: Primary | ICD-10-CM

## 2021-08-02 DIAGNOSIS — N13.8 BPH WITH OBSTRUCTION/LOWER URINARY TRACT SYMPTOMS: Primary | ICD-10-CM

## 2021-08-02 DIAGNOSIS — S37.019A PERINEPHRIC HEMATOMA: ICD-10-CM

## 2021-08-02 PROCEDURE — 99213 OFFICE O/P EST LOW 20 MIN: CPT | Performed by: UROLOGY

## 2021-08-02 NOTE — PROGRESS NOTES
Rooming Clinician:     Jil Centeno is a 62year old male. Patient presents with:  Consult: follow up on kidney hematoma. See CT 6/2 and 4/25/21; Other: Previous patient of Dr Jose Castillo.  Wanting to change MD due to work schedule;        HPI:     Patient ha impairment      Past Surgical History:   Procedure Laterality Date   • ANGIOPLASTY (CORONARY)     • CATH DRUG ELUTING STENT     • HERNIA SURGERY     • OTHER      reattachment tendon elbow bilaterl   • REMOVAL OF TONSILS,12+ Y/O     • TONSILLECTOMY        S increase in some physical activities. No jogging yet.     Diagnoses and all orders for this visit:    BPH with obstruction/lower urinary tract symptoms  -     URINALYSIS, AUTO, W/O SCOPE    Perinephric hematoma        Follow up examination in 3 months    T

## 2021-09-16 ENCOUNTER — APPOINTMENT (OUTPATIENT)
Dept: GENERAL RADIOLOGY | Facility: HOSPITAL | Age: 61
End: 2021-09-16
Attending: EMERGENCY MEDICINE
Payer: COMMERCIAL

## 2021-09-16 ENCOUNTER — HOSPITAL ENCOUNTER (OUTPATIENT)
Facility: HOSPITAL | Age: 61
Setting detail: OBSERVATION
Discharge: HOME | End: 2021-09-17
Attending: EMERGENCY MEDICINE | Admitting: HOSPITALIST
Payer: COMMERCIAL

## 2021-09-16 ENCOUNTER — APPOINTMENT (OUTPATIENT)
Dept: CV DIAGNOSTICS | Facility: HOSPITAL | Age: 61
End: 2021-09-16
Attending: INTERNAL MEDICINE
Payer: COMMERCIAL

## 2021-09-16 DIAGNOSIS — R07.9 CHEST PAIN OF UNCERTAIN ETIOLOGY: Primary | ICD-10-CM

## 2021-09-16 DIAGNOSIS — I48.91 ATRIAL FIBRILLATION WITH CONTROLLED VENTRICULAR RESPONSE (HCC): ICD-10-CM

## 2021-09-16 LAB
ALBUMIN SERPL-MCNC: 3.5 G/DL (ref 3.4–5)
ALBUMIN/GLOB SERPL: 1 {RATIO} (ref 1–2)
ALP LIVER SERPL-CCNC: 46 U/L
ALT SERPL-CCNC: 63 U/L
ANION GAP SERPL CALC-SCNC: 4 MMOL/L (ref 0–18)
AST SERPL-CCNC: 34 U/L (ref 15–37)
BASOPHILS # BLD AUTO: 0.03 X10(3) UL (ref 0–0.2)
BASOPHILS NFR BLD AUTO: 0.6 %
BILIRUB SERPL-MCNC: 0.8 MG/DL (ref 0.1–2)
BUN BLD-MCNC: 19 MG/DL (ref 7–18)
CALCIUM BLD-MCNC: 8.8 MG/DL (ref 8.5–10.1)
CHLORIDE SERPL-SCNC: 111 MMOL/L (ref 98–112)
CO2 SERPL-SCNC: 27 MMOL/L (ref 21–32)
CREAT BLD-MCNC: 1.09 MG/DL
EOSINOPHIL # BLD AUTO: 0.11 X10(3) UL (ref 0–0.7)
EOSINOPHIL NFR BLD AUTO: 2.2 %
ERYTHROCYTE [DISTWIDTH] IN BLOOD BY AUTOMATED COUNT: 14.2 %
GLOBULIN PLAS-MCNC: 3.4 G/DL (ref 2.8–4.4)
GLUCOSE BLD-MCNC: 91 MG/DL (ref 70–99)
HCT VFR BLD AUTO: 44.9 %
HGB BLD-MCNC: 15.6 G/DL
IMM GRANULOCYTES # BLD AUTO: 0.01 X10(3) UL (ref 0–1)
IMM GRANULOCYTES NFR BLD: 0.2 %
LYMPHOCYTES # BLD AUTO: 1.69 X10(3) UL (ref 1–4)
LYMPHOCYTES NFR BLD AUTO: 33.3 %
MCH RBC QN AUTO: 32.5 PG (ref 26–34)
MCHC RBC AUTO-ENTMCNC: 34.7 G/DL (ref 31–37)
MCV RBC AUTO: 93.5 FL
MONOCYTES # BLD AUTO: 0.7 X10(3) UL (ref 0.1–1)
MONOCYTES NFR BLD AUTO: 13.8 %
NEUTROPHILS # BLD AUTO: 2.53 X10 (3) UL (ref 1.5–7.7)
NEUTROPHILS # BLD AUTO: 2.53 X10(3) UL (ref 1.5–7.7)
NEUTROPHILS NFR BLD AUTO: 49.9 %
OSMOLALITY SERPL CALC.SUM OF ELEC: 296 MOSM/KG (ref 275–295)
PLATELET # BLD AUTO: 163 10(3)UL (ref 150–450)
POTASSIUM SERPL-SCNC: 3.8 MMOL/L (ref 3.5–5.1)
PROT SERPL-MCNC: 6.9 G/DL (ref 6.4–8.2)
RBC # BLD AUTO: 4.8 X10(6)UL
SARS-COV-2 RNA RESP QL NAA+PROBE: NOT DETECTED
SODIUM SERPL-SCNC: 142 MMOL/L (ref 136–145)
TROPONIN I SERPL-MCNC: <0.045 NG/ML (ref ?–0.04)
TROPONIN I SERPL-MCNC: <0.045 NG/ML (ref ?–0.04)
WBC # BLD AUTO: 5.1 X10(3) UL (ref 4–11)

## 2021-09-16 PROCEDURE — 93306 TTE W/DOPPLER COMPLETE: CPT | Performed by: INTERNAL MEDICINE

## 2021-09-16 PROCEDURE — 71045 X-RAY EXAM CHEST 1 VIEW: CPT | Performed by: EMERGENCY MEDICINE

## 2021-09-16 PROCEDURE — 99220 INITIAL OBSERVATION CARE,LEVL III: CPT | Performed by: HOSPITALIST

## 2021-09-16 RX ORDER — ATORVASTATIN CALCIUM 80 MG/1
80 TABLET, FILM COATED ORAL NIGHTLY
Status: DISCONTINUED | OUTPATIENT
Start: 2021-09-16 | End: 2021-09-17

## 2021-09-16 RX ORDER — DOXEPIN HYDROCHLORIDE 50 MG/1
1 CAPSULE ORAL DAILY
Status: DISCONTINUED | OUTPATIENT
Start: 2021-09-17 | End: 2021-09-17

## 2021-09-16 RX ORDER — MULTIVITAMIN WITH FOLIC ACID 400 MCG
1 TABLET ORAL DAILY
COMMUNITY

## 2021-09-16 RX ORDER — ONDANSETRON 2 MG/ML
4 INJECTION INTRAMUSCULAR; INTRAVENOUS EVERY 6 HOURS PRN
Status: DISCONTINUED | OUTPATIENT
Start: 2021-09-16 | End: 2021-09-17

## 2021-09-16 RX ORDER — SOTALOL HYDROCHLORIDE 120 MG/1
120 TABLET ORAL EVERY 12 HOURS SCHEDULED
Status: DISCONTINUED | OUTPATIENT
Start: 2021-09-16 | End: 2021-09-16

## 2021-09-16 RX ORDER — ASCORBIC ACID 500 MG
250 TABLET ORAL DAILY
Status: DISCONTINUED | OUTPATIENT
Start: 2021-09-17 | End: 2021-09-17

## 2021-09-16 RX ORDER — NITROGLYCERIN 0.4 MG/1
0.4 TABLET SUBLINGUAL EVERY 5 MIN PRN
Status: DISCONTINUED | OUTPATIENT
Start: 2021-09-16 | End: 2021-09-17

## 2021-09-16 RX ORDER — CLOPIDOGREL BISULFATE 75 MG/1
75 TABLET ORAL DAILY
Status: DISCONTINUED | OUTPATIENT
Start: 2021-09-17 | End: 2021-09-17

## 2021-09-16 RX ORDER — SOTALOL HYDROCHLORIDE 120 MG/1
120 TABLET ORAL EVERY 12 HOURS SCHEDULED
Status: DISCONTINUED | OUTPATIENT
Start: 2021-09-16 | End: 2021-09-17

## 2021-09-16 RX ORDER — ACETAMINOPHEN 325 MG/1
650 TABLET ORAL EVERY 6 HOURS PRN
Status: DISCONTINUED | OUTPATIENT
Start: 2021-09-16 | End: 2021-09-17

## 2021-09-16 RX ORDER — PROCHLORPERAZINE EDISYLATE 5 MG/ML
5 INJECTION INTRAMUSCULAR; INTRAVENOUS EVERY 8 HOURS PRN
Status: DISCONTINUED | OUTPATIENT
Start: 2021-09-16 | End: 2021-09-17

## 2021-09-16 RX ORDER — MIDODRINE HYDROCHLORIDE 5 MG/1
10 TABLET ORAL 3 TIMES DAILY
Status: DISCONTINUED | OUTPATIENT
Start: 2021-09-16 | End: 2021-09-17

## 2021-09-16 RX ORDER — POTASSIUM CHLORIDE 20 MEQ/1
40 TABLET, EXTENDED RELEASE ORAL ONCE
Status: COMPLETED | OUTPATIENT
Start: 2021-09-16 | End: 2021-09-16

## 2021-09-16 NOTE — ED PROVIDER NOTES
Patient Seen in: [facility] 8ne-a      History   Patient presents with:  Chest Pain Angina    Stated Complaint: Chest Pain    Subjective:   HPI    Patient is a 50-year-old male presents emergency room for evaluation of chest pressure.   Patient compl 6 years ago    Vaping Use      Vaping Use: Never used    Alcohol use: Yes      Comment: 1-2 drinks on weekends    Drug use: No             Review of Systems    Positive for stated complaint: Chest Pain  Other systems are as noted in HPI.   Constitutional an Differential With Platelet.   Procedure                               Abnormality         Status                     ---------                               -----------         ------                     CBC W/ DIFFERENTIAL[[id number]] [facility] for further workup.   Admission disposition: 9/16/2021  1:16 PM                                  Disposition and Plan     Clinical Impression:  Chest pain of uncertain etiology  (primary encounter diagnosis)  Atrial fibrillation with controlled osmel

## 2021-09-16 NOTE — CONSULTS
[facility] Cardiology  Consultation Note      Kierra Zheng Patient Status:  Observation    1960 MRN [id number]   [facility] 8NE-A Attending Sherry Crandall MD   Hosp Day # 0 PCP Ramya Garcia MD     Reason for consult: Chest hematoma - is following with surveillance scans with urology, but does not think he feels it with normal walking. No other symptoms to report at this time.      Medications:  nitroGLYCERIN (NITROSTAT) SL tab 0.4 mg, 0.4 mg, Sublingual, Q5 Min PRN  Sotalol H alert; in no acute distress  HEENT: Extraocular movements are intact; sclerae are anicteric; scalp is atrauamatic  Neck: Supple; no JVD; no carotid bruits  Cardiac: Irreg irreg, variable S1, nl S2, no murmurs/rubs/gallops are appreciated  Lungs: Clear to a ventricle: The cavity size was normal. Wall thickness was normal.      Systolic function was normal. The estimated ejection fraction was 55-60%.      Left ventricular diastolic function parameters were normal.   2. Mitral valve:  There was mild regurgitatio

## 2021-09-16 NOTE — H&P
[facility] HOSPITALIST  History and Physical     [facility] Patient Status:  Observation    1960 MRN [id number]   [facility] 8NE-A Attending Ziggy Blanca MD   Hosp Day # 0 PCP Chantelle Alvarez MD     Chief Complaint: CP     History of smokeless tobacco. He reports current alcohol use. He reports that he does not use drugs.   Family History:   Family History   Problem Relation Age of Onset   • Heart Disease Father    • Cancer Father    • Heart Disease Mother    • Hypertension Brother    • guarding. Neurologic: CNII-XII grossly intact. No focal neurological deficits. Musculoskeletal: Moves all extremities. Extremities: No edema. No cyanosis. Integument: No rashes or lesions. Psychiatric: Appropriate mood and affect.   Diagnostic Data:

## 2021-09-16 NOTE — ED QUICK NOTES
Orders for admission, patient is aware of plan and ready to go upstairs. Any questions, please call ED TRICIA Trinidad  at extension [phone]. Vaccinated?  yes  Type of COVID test sent: Rapid Sars  COVID Suspicion level: Low/High- LOW      Titratable drug(s) infu

## 2021-09-16 NOTE — ED INITIAL ASSESSMENT (HPI)
A/o x3, pt here with SIMA and chest pressure since last night, was in afib with RVR, woke up this morning with rapid a fib but worsening symptoms.  \"Feels like something is sitting on my chest\"

## 2021-09-17 ENCOUNTER — APPOINTMENT (OUTPATIENT)
Dept: CV DIAGNOSTICS | Facility: HOSPITAL | Age: 61
End: 2021-09-17
Attending: INTERNAL MEDICINE
Payer: COMMERCIAL

## 2021-09-17 VITALS
DIASTOLIC BLOOD PRESSURE: 84 MMHG | BODY MASS INDEX: 26 KG/M2 | OXYGEN SATURATION: 97 % | HEART RATE: 82 BPM | RESPIRATION RATE: 18 BRPM | TEMPERATURE: 98 F | WEIGHT: 185.69 LBS | SYSTOLIC BLOOD PRESSURE: 112 MMHG | HEIGHT: 71 IN

## 2021-09-17 LAB
ATRIAL RATE: 241 BPM
POTASSIUM SERPL-SCNC: 4.3 MMOL/L (ref 3.5–5.1)
Q-T INTERVAL: 368 MS
QRS DURATION: 84 MS
QTC CALCULATION (BEZET): 445 MS
R AXIS: 59 DEGREES
T AXIS: -2 DEGREES
TROPONIN I SERPL-MCNC: <0.045 NG/ML (ref ?–0.04)
VENTRICULAR RATE: 88 BPM

## 2021-09-17 PROCEDURE — 78452 HT MUSCLE IMAGE SPECT MULT: CPT | Performed by: INTERNAL MEDICINE

## 2021-09-17 PROCEDURE — 99217 OBSERVATION CARE DISCHARGE: CPT | Performed by: HOSPITALIST

## 2021-09-17 PROCEDURE — 93017 CV STRESS TEST TRACING ONLY: CPT | Performed by: INTERNAL MEDICINE

## 2021-09-17 PROCEDURE — 93018 CV STRESS TEST I&R ONLY: CPT | Performed by: INTERNAL MEDICINE

## 2021-09-17 RX ORDER — SOTALOL HYDROCHLORIDE 120 MG/1
120 TABLET ORAL EVERY 12 HOURS SCHEDULED
Qty: 60 TABLET | Refills: 5 | Status: ON HOLD | OUTPATIENT
Start: 2021-09-17 | End: 2021-11-02

## 2021-09-17 NOTE — PROGRESS NOTES
Cardiac Diagnostic Note:    Pt injected with lexiscan for nuclear stress test  Pt denied cardiac symptoms  No arrhythmias  Nuc pending.

## 2021-09-17 NOTE — PROGRESS NOTES
09/17/21 1607   Clinical Encounter Type   Visited With Health care provider   Routine Visit Discharge   Per nurse harmon patient went home.

## 2021-09-17 NOTE — PLAN OF CARE
NURSING NOTES:  0800: Pt received AOx4. Room air. At-fib. RAC saline lock. BRP. Denies pain. 0815: Pt went for Lexiscan  1030: Pt came back from the test. Sotalol given and will adjust times to 1030am and 1030pm.  1400: Dr. Mike Barney came and see pt.

## 2021-09-17 NOTE — PROGRESS NOTES
Admitted patient. Oriented to room and unit. Admission navigator completed. Pt AOx4. Calm, cooperative. Independent. Afib on cardiac monitor. Controlled rates. Adequate saturation on RA. Lung sounds clear.   Reports his chest pain now is mild 1/10 altagracia

## 2021-09-17 NOTE — PROGRESS NOTES
[facility]     Hospitalist Progress Note     Loly Oleary Patient Status:  Observation    1960 MRN [id number]   [facility] 8NE-A Attending Kitty Sanabria [facility] Day # 0 PCP Jyothi Collins MD     Chief Complaint: chest p Markers  No results for input(s): CRP, RKISSY, LDH, DDIMER in the last 168 hours. Imaging: Imaging data reviewed in Epic.     Medications:   • atorvastatin  80 mg Oral Nightly   • clopidogrel  75 mg Oral Daily   • ascorbic acid  250 mg Oral Daily   • midodr

## 2021-09-17 NOTE — PROGRESS NOTES
[facility] Cardiology Progress Note        Mariano Calix Patient Status:  Observation    1960 MRN [id number]   [facility] 8NE-A [facility] Day [facility] 0 PCP Edgar Michael MD     Oleg --    CREATSERUM 1.09  --    CA 8.8  --    GLU 91  --        Recent Labs   Lab 09/16/21  1141   ALT 63*   AST 34   ALB 3.5       Recent Labs   Lab 09/16/21  1141 09/16/21 2025 09/17/21  0530   TROP <0.045 <0.045 <0.045       No results for input(s): PTP,

## 2021-09-17 NOTE — PROGRESS NOTES
09/17/21 0536 09/17/21 0540 09/17/21 0542   Vital Signs   /83 112/71 (!) 79/59   MAP (mmHg) 92 79 63   BP Location Left arm Left arm Left arm   BP Method Automatic Automatic Automatic   Patient Position Lying Sitting Standing     Symptomatic, orth

## 2021-09-17 NOTE — PLAN OF CARE
Assumed care of pt at [facility]. A/ox4, rm air, afib w/ rates controlled on tele. No reports of pain. Breath sounds clear upon auscultation. Knee brace present on LLE. Orthos to be obtained in the AM.    Discussed POC w/ pt. Will continue to monitor.     Problem Assess quality of pulses, skin color and temperature  - Assess for signs of decreased coronary artery perfusion - ex.  Angina  - Evaluate fluid balance, assess for edema, trend weights  Outcome: Progressing  Goal: Absence of cardiac arrhythmias or at baseli

## 2021-09-22 NOTE — DISCHARGE SUMMARY
[facility] HOSPITALIST  DISCHARGE SUMMARY     Lala Cool Patient Status:  Observation    1960 MRN [id number]   [facility] 8NE-A Attending No att. providers found   Hosp Day # 0 PCP Alfonso Reynolds MD     Date of Admission: 2021  Date TCM Follow-Up Recommendation:  LACE > 58:  High Risk of readmission after discharge from the hospital.    Procedures during hospitalization:   • None    Incidental or significant findings and recommendations (brief descriptions):  • None    Lab/Test r appointment as soon as possible for a visit in 1 week  see your PCP in 1-2 weeks    Appointments for Next 30 Days 9/21/2021 - 10/21/2021              Date Arrival Time Visit Type Length Department Provider     9/24/2021  1:00 PM  [address] [facility] [0219] exam rooms, unless otherwise noted and in accordance with departmental policy.      PATIENT RESPONSIBILITY ESTIMATE    - (Estimate) We will provide you with your estimated remaining deductible and coinsurance balance for your services at the time of check i electronic chart    Juan Jose Burrell DO    Time spent:  > 30 minutes

## 2021-09-24 ENCOUNTER — HOSPITAL ENCOUNTER (OUTPATIENT)
Dept: CT IMAGING | Facility: HOSPITAL | Age: 61
Discharge: HOME | End: 2021-09-24
Attending: UROLOGY
Payer: COMMERCIAL

## 2021-09-24 DIAGNOSIS — N13.8 BPH WITH OBSTRUCTION/LOWER URINARY TRACT SYMPTOMS: ICD-10-CM

## 2021-09-24 DIAGNOSIS — N40.1 BPH WITH OBSTRUCTION/LOWER URINARY TRACT SYMPTOMS: ICD-10-CM

## 2021-09-24 PROCEDURE — 74170 CT ABD WO CNTRST FLWD CNTRST: CPT | Performed by: UROLOGY

## 2021-10-15 NOTE — IMAGING NOTE
Call placed to pt regarding CTA Gated pulmonary vein study on 10/19/21. Instructed to arrive at 1345. Instructed to drink plenty of fluids a day prior to procedure and day of procedure. May eat a light breakfast/lunch.  Advised to hold caffeine 12 hrs prior

## 2021-10-19 ENCOUNTER — HOSPITAL ENCOUNTER (OUTPATIENT)
Dept: CT IMAGING | Facility: HOSPITAL | Age: 61
Discharge: HOME | End: 2021-10-19
Attending: INTERNAL MEDICINE
Payer: COMMERCIAL

## 2021-10-19 VITALS — SYSTOLIC BLOOD PRESSURE: 130 MMHG | DIASTOLIC BLOOD PRESSURE: 65 MMHG | HEART RATE: 56 BPM

## 2021-10-19 DIAGNOSIS — Z01.818 PREOP TESTING: ICD-10-CM

## 2021-10-19 DIAGNOSIS — I48.0 PAROXYSMAL ATRIAL FIBRILLATION (HCC): ICD-10-CM

## 2021-10-19 PROCEDURE — 75574 CT ANGIO HRT W/3D IMAGE: CPT | Performed by: INTERNAL MEDICINE

## 2021-10-19 NOTE — IMAGING NOTE
Received Tevin Picket to CT Rm 4 working with [address] [place] [address]. Verified name, , allergies. IV access with 20G angiocath to right antecubital area. Positioned pt on table. Procedure explained and questions answered.  Vital signs monitored and noted in

## 2021-10-29 ENCOUNTER — LAB ENCOUNTER (OUTPATIENT)
Dept: LAB | Facility: HOSPITAL | Age: 61
End: 2021-10-29
Attending: INTERNAL MEDICINE
Payer: COMMERCIAL

## 2021-10-29 DIAGNOSIS — I48.0 PAF (PAROXYSMAL ATRIAL FIBRILLATION) (HCC): ICD-10-CM

## 2021-11-01 ENCOUNTER — HOSPITAL ENCOUNTER (OUTPATIENT)
Dept: INTERVENTIONAL RADIOLOGY/VASCULAR | Facility: HOSPITAL | Age: 61
Setting detail: OBSERVATION
Discharge: HOME | End: 2021-11-02
Attending: INTERNAL MEDICINE | Admitting: INTERNAL MEDICINE
Payer: COMMERCIAL

## 2021-11-01 ENCOUNTER — ANESTHESIA (OUTPATIENT)
Dept: INTERVENTIONAL RADIOLOGY/VASCULAR | Facility: HOSPITAL | Age: 61
End: 2021-11-01
Payer: COMMERCIAL

## 2021-11-01 ENCOUNTER — HOSPITAL ENCOUNTER (OUTPATIENT)
Dept: CV DIAGNOSTICS | Facility: HOSPITAL | Age: 61
Discharge: HOME | End: 2021-11-01
Attending: INTERNAL MEDICINE
Payer: COMMERCIAL

## 2021-11-01 DIAGNOSIS — I48.0 PAF (PAROXYSMAL ATRIAL FIBRILLATION) (HCC): Primary | ICD-10-CM

## 2021-11-01 DIAGNOSIS — I48.0 PAF (PAROXYSMAL ATRIAL FIBRILLATION) (HCC): ICD-10-CM

## 2021-11-01 PROCEDURE — 02583ZZ DESTRUCTION OF CONDUCTION MECHANISM, PERCUTANEOUS APPROACH: ICD-10-PCS | Performed by: INTERNAL MEDICINE

## 2021-11-01 PROCEDURE — 02K83ZZ MAP CONDUCTION MECHANISM, PERCUTANEOUS APPROACH: ICD-10-PCS | Performed by: INTERNAL MEDICINE

## 2021-11-01 PROCEDURE — 85347 COAGULATION TIME ACTIVATED: CPT

## 2021-11-01 PROCEDURE — 93613 INTRACARDIAC EPHYS 3D MAPG: CPT

## 2021-11-01 PROCEDURE — B245YZZ ULTRASONOGRAPHY OF LEFT HEART USING OTHER CONTRAST: ICD-10-PCS | Performed by: INTERNAL MEDICINE

## 2021-11-01 PROCEDURE — 93656 COMPRE EP EVAL ABLTJ ATR FIB: CPT

## 2021-11-01 PROCEDURE — 76942 ECHO GUIDE FOR BIOPSY: CPT | Performed by: ANESTHESIOLOGY

## 2021-11-01 PROCEDURE — 93662 INTRACARDIAC ECG (ICE): CPT

## 2021-11-01 RX ORDER — PHENYLEPHRINE HCL 10 MG/ML
VIAL (ML) INJECTION AS NEEDED
Status: DISCONTINUED | OUTPATIENT
Start: 2021-11-01 | End: 2021-11-01 | Stop reason: SURG

## 2021-11-01 RX ORDER — PROTAMINE SULFATE 10 MG/ML
INJECTION, SOLUTION INTRAVENOUS AS NEEDED
Status: DISCONTINUED | OUTPATIENT
Start: 2021-11-01 | End: 2021-11-01 | Stop reason: SURG

## 2021-11-01 RX ORDER — ACETAMINOPHEN 325 MG/1
650 TABLET ORAL EVERY 6 HOURS PRN
Status: DISCONTINUED | OUTPATIENT
Start: 2021-11-01 | End: 2021-11-02

## 2021-11-01 RX ORDER — HYDROCODONE BITARTRATE AND ACETAMINOPHEN 5; 325 MG/1; MG/1
1 TABLET ORAL AS NEEDED
Status: DISCONTINUED | OUTPATIENT
Start: 2021-11-01 | End: 2021-11-01 | Stop reason: HOSPADM

## 2021-11-01 RX ORDER — METOCLOPRAMIDE HYDROCHLORIDE 5 MG/ML
INJECTION INTRAMUSCULAR; INTRAVENOUS AS NEEDED
Status: DISCONTINUED | OUTPATIENT
Start: 2021-11-01 | End: 2021-11-01 | Stop reason: SURG

## 2021-11-01 RX ORDER — DOXEPIN HYDROCHLORIDE 50 MG/1
1 CAPSULE ORAL DAILY
Status: DISCONTINUED | OUTPATIENT
Start: 2021-11-01 | End: 2021-11-02

## 2021-11-01 RX ORDER — ATORVASTATIN CALCIUM 80 MG/1
80 TABLET, FILM COATED ORAL NIGHTLY
Status: DISCONTINUED | OUTPATIENT
Start: 2021-11-01 | End: 2021-11-02

## 2021-11-01 RX ORDER — ROCURONIUM BROMIDE 10 MG/ML
INJECTION, SOLUTION INTRAVENOUS AS NEEDED
Status: DISCONTINUED | OUTPATIENT
Start: 2021-11-01 | End: 2021-11-01 | Stop reason: SURG

## 2021-11-01 RX ORDER — HYDRALAZINE HYDROCHLORIDE 20 MG/ML
INJECTION INTRAMUSCULAR; INTRAVENOUS
Status: COMPLETED
Start: 2021-11-01 | End: 2021-11-01

## 2021-11-01 RX ORDER — HYDROCODONE BITARTRATE AND ACETAMINOPHEN 5; 325 MG/1; MG/1
2 TABLET ORAL AS NEEDED
Status: DISCONTINUED | OUTPATIENT
Start: 2021-11-01 | End: 2021-11-01 | Stop reason: HOSPADM

## 2021-11-01 RX ORDER — CHLORHEXIDINE GLUCONATE 4 G/100ML
30 SOLUTION TOPICAL
Status: DISCONTINUED | OUTPATIENT
Start: 2021-11-02 | End: 2021-11-01 | Stop reason: HOSPADM

## 2021-11-01 RX ORDER — ONDANSETRON 2 MG/ML
INJECTION INTRAMUSCULAR; INTRAVENOUS AS NEEDED
Status: DISCONTINUED | OUTPATIENT
Start: 2021-11-01 | End: 2021-11-01 | Stop reason: SURG

## 2021-11-01 RX ORDER — SODIUM CHLORIDE, SODIUM LACTATE, POTASSIUM CHLORIDE, CALCIUM CHLORIDE 600; 310; 30; 20 MG/100ML; MG/100ML; MG/100ML; MG/100ML
INJECTION, SOLUTION INTRAVENOUS CONTINUOUS
Status: DISCONTINUED | OUTPATIENT
Start: 2021-11-01 | End: 2021-11-01 | Stop reason: HOSPADM

## 2021-11-01 RX ORDER — HYDRALAZINE HYDROCHLORIDE 20 MG/ML
INJECTION INTRAMUSCULAR; INTRAVENOUS AS NEEDED
Status: DISCONTINUED | OUTPATIENT
Start: 2021-11-01 | End: 2021-11-01 | Stop reason: SURG

## 2021-11-01 RX ORDER — NALOXONE HYDROCHLORIDE 0.4 MG/ML
80 INJECTION, SOLUTION INTRAMUSCULAR; INTRAVENOUS; SUBCUTANEOUS AS NEEDED
Status: DISCONTINUED | OUTPATIENT
Start: 2021-11-01 | End: 2021-11-01 | Stop reason: HOSPADM

## 2021-11-01 RX ORDER — ONDANSETRON 2 MG/ML
4 INJECTION INTRAMUSCULAR; INTRAVENOUS EVERY 6 HOURS PRN
Status: DISCONTINUED | OUTPATIENT
Start: 2021-11-01 | End: 2021-11-02

## 2021-11-01 RX ORDER — MIDAZOLAM HYDROCHLORIDE 1 MG/ML
INJECTION INTRAMUSCULAR; INTRAVENOUS AS NEEDED
Status: DISCONTINUED | OUTPATIENT
Start: 2021-11-01 | End: 2021-11-01 | Stop reason: SURG

## 2021-11-01 RX ORDER — ACETAMINOPHEN 500 MG
1000 TABLET ORAL ONCE AS NEEDED
Status: DISCONTINUED | OUTPATIENT
Start: 2021-11-01 | End: 2021-11-01 | Stop reason: HOSPADM

## 2021-11-01 RX ORDER — DEXAMETHASONE SODIUM PHOSPHATE 4 MG/ML
VIAL (ML) INJECTION AS NEEDED
Status: DISCONTINUED | OUTPATIENT
Start: 2021-11-01 | End: 2021-11-01 | Stop reason: SURG

## 2021-11-01 RX ORDER — LIDOCAINE HYDROCHLORIDE 10 MG/ML
INJECTION, SOLUTION EPIDURAL; INFILTRATION; INTRACAUDAL; PERINEURAL AS NEEDED
Status: DISCONTINUED | OUTPATIENT
Start: 2021-11-01 | End: 2021-11-01 | Stop reason: SURG

## 2021-11-01 RX ORDER — SODIUM CHLORIDE 9 MG/ML
INJECTION, SOLUTION INTRAVENOUS
Status: COMPLETED | OUTPATIENT
Start: 2021-11-02 | End: 2021-11-02

## 2021-11-01 RX ORDER — LIDOCAINE HYDROCHLORIDE 10 MG/ML
INJECTION, SOLUTION EPIDURAL; INFILTRATION; INTRACAUDAL; PERINEURAL
Status: COMPLETED
Start: 2021-11-01 | End: 2021-11-01

## 2021-11-01 RX ORDER — HYDROMORPHONE HYDROCHLORIDE 1 MG/ML
0.4 INJECTION, SOLUTION INTRAMUSCULAR; INTRAVENOUS; SUBCUTANEOUS EVERY 5 MIN PRN
Status: DISCONTINUED | OUTPATIENT
Start: 2021-11-01 | End: 2021-11-01 | Stop reason: HOSPADM

## 2021-11-01 RX ORDER — HEPARIN SODIUM 5000 [USP'U]/ML
INJECTION, SOLUTION INTRAVENOUS; SUBCUTANEOUS
Status: COMPLETED
Start: 2021-11-01 | End: 2021-11-01

## 2021-11-01 RX ORDER — SOTALOL HYDROCHLORIDE 120 MG/1
120 TABLET ORAL DAILY
Status: DISCONTINUED | OUTPATIENT
Start: 2021-11-02 | End: 2021-11-02

## 2021-11-01 RX ORDER — HEPARIN SODIUM 5000 [USP'U]/ML
INJECTION, SOLUTION INTRAVENOUS; SUBCUTANEOUS AS NEEDED
Status: DISCONTINUED | OUTPATIENT
Start: 2021-11-01 | End: 2021-11-01 | Stop reason: SURG

## 2021-11-01 RX ORDER — ASCORBIC ACID 500 MG
250 TABLET ORAL DAILY
Status: DISCONTINUED | OUTPATIENT
Start: 2021-11-01 | End: 2021-11-02

## 2021-11-01 RX ORDER — MIDODRINE HYDROCHLORIDE 5 MG/1
10 TABLET ORAL 3 TIMES DAILY
Status: DISCONTINUED | OUTPATIENT
Start: 2021-11-01 | End: 2021-11-02

## 2021-11-01 RX ORDER — HYDRALAZINE HYDROCHLORIDE 20 MG/ML
5 INJECTION INTRAMUSCULAR; INTRAVENOUS ONCE
Status: COMPLETED | OUTPATIENT
Start: 2021-11-01 | End: 2021-11-01

## 2021-11-01 RX ADMIN — ATORVASTATIN CALCIUM 80 MG: 80 TABLET, FILM COATED ORAL at 23:14:00

## 2021-11-01 RX ADMIN — ROCURONIUM BROMIDE 50 MG: 10 INJECTION, SOLUTION INTRAVENOUS at 14:47:00

## 2021-11-01 RX ADMIN — MIDODRINE HYDROCHLORIDE 10 MG: 5 TABLET ORAL at 23:31:00

## 2021-11-01 RX ADMIN — MIDAZOLAM HYDROCHLORIDE 2 MG: 1 INJECTION INTRAMUSCULAR; INTRAVENOUS at 14:37:00

## 2021-11-01 RX ADMIN — SODIUM CHLORIDE: 9 INJECTION, SOLUTION INTRAVENOUS at 15:55:00

## 2021-11-01 RX ADMIN — HEPARIN SODIUM 5000 UNITS: 5000 INJECTION, SOLUTION INTRAVENOUS; SUBCUTANEOUS at 15:24:00

## 2021-11-01 RX ADMIN — HYDRALAZINE HYDROCHLORIDE 5 MG: 20 INJECTION INTRAMUSCULAR; INTRAVENOUS at 16:03:00

## 2021-11-01 RX ADMIN — HEPARIN SODIUM 5000 UNITS: 5000 INJECTION, SOLUTION INTRAVENOUS; SUBCUTANEOUS at 15:40:00

## 2021-11-01 RX ADMIN — LIDOCAINE HYDROCHLORIDE 50 MG: 10 INJECTION, SOLUTION EPIDURAL; INFILTRATION; INTRACAUDAL; PERINEURAL at 14:37:00

## 2021-11-01 RX ADMIN — PHENYLEPHRINE HCL 50 MCG: 10 MG/ML VIAL (ML) INJECTION at 15:26:00

## 2021-11-01 RX ADMIN — HYDRALAZINE HYDROCHLORIDE 5 MG: 20 INJECTION INTRAMUSCULAR; INTRAVENOUS at 17:50:00

## 2021-11-01 RX ADMIN — HYDRALAZINE HYDROCHLORIDE 5 MG: 20 INJECTION INTRAMUSCULAR; INTRAVENOUS at 16:48:00

## 2021-11-01 RX ADMIN — DEXAMETHASONE SODIUM PHOSPHATE 4 MG: 4 MG/ML VIAL (ML) INJECTION at 14:55:00

## 2021-11-01 RX ADMIN — PROTAMINE SULFATE 50 MG: 10 INJECTION, SOLUTION INTRAVENOUS at 16:22:00

## 2021-11-01 RX ADMIN — HEPARIN SODIUM 5000 UNITS: 5000 INJECTION, SOLUTION INTRAVENOUS; SUBCUTANEOUS at 15:17:00

## 2021-11-01 RX ADMIN — ONDANSETRON 4 MG: 2 INJECTION INTRAMUSCULAR; INTRAVENOUS at 14:55:00

## 2021-11-01 RX ADMIN — SODIUM CHLORIDE: 9 INJECTION, SOLUTION INTRAVENOUS at 14:37:00

## 2021-11-01 RX ADMIN — SODIUM CHLORIDE: 9 INJECTION, SOLUTION INTRAVENOUS at 16:38:00

## 2021-11-01 RX ADMIN — METOCLOPRAMIDE HYDROCHLORIDE 10 MG: 5 INJECTION INTRAMUSCULAR; INTRAVENOUS at 14:55:00

## 2021-11-01 NOTE — ANESTHESIA PROCEDURE NOTES
Airway  Date/Time: 11/1/2021 2:48 PM  Urgency: elective    Airway not difficult    General Information and Staff    Patient location during procedure: OR  Anesthesiologist: Bakari Samano MD  Performed: anesthesiologist     Indications and Patient C

## 2021-11-01 NOTE — ANESTHESIA PREPROCEDURE EVALUATION
PRE-OP EVALUATION    Patient Name: Karla May    Admit Diagnosis: [facility] [I48.[facility]    Pre-op Diagnosis: * No pre-op diagnosis entered *        Anesthesia Procedure: CATH EP  CARD ECHO FRANCO (CPT=93320/40224)    * No surg (+) chronic renal disease                    Cardiovascular        Exercise tolerance: good     MET: >4           (+) CAD    (+) CABG/stent                            Endo/Other    Negative endo/other ROS.                               Pulmonary    Ne post-operative pain/discomfort, dental /lip injury, pressure/nerve injuries from positioning, recall, and other serious but rare complications. Discussed additional access and arterial line. All questions answered, concerns addressed.     Plan/risks discuss

## 2021-11-01 NOTE — PROCEDURES
Electrophysiology Study with Cryoablation      History:  Jagjit Power is a 62year old male with paroxysmal atrial fibrillation who presents for an ablation.  The risks (including, but not limited to, hematoma, vascular damage, pneumothorax, tamponade, ne catheter. Balloon occlusive venograms were performed. NavX and ICE were used for balloon placement in the antrum of the veins. The pulmonary veins were isolated in the following order: Left common PV, RIPV, RSPV.     All PVs had clear PV potentials prio

## 2021-11-01 NOTE — ANESTHESIA POSTPROCEDURE EVALUATION
[facility] Patient Status:  Outpatient in a Bed   Age/Gender 62year old male MRN [id number]   Location [facility] Attending Saadia Palacio MD   Hosp Day # 0 PCP Rani Crowe MD       Anesthesia P

## 2021-11-01 NOTE — H&P
Pre-op Diagnosis: * No pre-op diagnosis entered *    The above referenced H&P (10/14/2021 by me) was reviewed by Nader Nielsen MD on 11/1/2021, the patient was examined and no significant changes have occurred in the patient's condition since the H&P

## 2021-11-01 NOTE — ANESTHESIA PROCEDURE NOTES
Arterial Line  Performed by: Yovany Devries MD  Authorized by: Yovany Devries MD     General Information and Staff    Procedure Start:  11/1/2021 2:49 PM  Procedure End:  11/1/2021 2:50 PM  Anesthesiologist:  Yovany Devries MD  Perfo

## 2021-11-02 VITALS
DIASTOLIC BLOOD PRESSURE: 70 MMHG | HEART RATE: 59 BPM | SYSTOLIC BLOOD PRESSURE: 113 MMHG | TEMPERATURE: 99 F | WEIGHT: 190 LBS | OXYGEN SATURATION: 98 % | BODY MASS INDEX: 26.6 KG/M2 | RESPIRATION RATE: 15 BRPM | HEIGHT: 71 IN

## 2021-11-02 RX ORDER — SOTALOL HYDROCHLORIDE 120 MG/1
60 TABLET ORAL 2 TIMES DAILY
Qty: 60 TABLET | Refills: 5 | Status: SHIPPED | OUTPATIENT
Start: 2021-11-02

## 2021-11-02 RX ADMIN — SOTALOL HYDROCHLORIDE 120 MG: 120 TABLET ORAL at 10:49:00

## 2021-11-02 RX ADMIN — MIDODRINE HYDROCHLORIDE 10 MG: 5 TABLET ORAL at 05:35:00

## 2021-11-02 RX ADMIN — DOXEPIN HYDROCHLORIDE 1 TABLET: 50 CAPSULE ORAL at 10:48:00

## 2021-11-02 RX ADMIN — ASCORBIC ACID 250 MG: 500 MG TABLET ORAL at 10:49:00

## 2021-11-02 NOTE — CM/SW NOTE
atient started on eliquis--30 day free as well as co-savings card given to pt (needs to be activated)

## 2021-11-02 NOTE — PLAN OF CARE
Pt AOx4, RA, no stated pain. States he's feeling \"much better\" since the ablation. Called in eliquis coupon; paid for 90 days. Discharge on eliquis  & sotalol 60 bid.     Problem: Patient/Family Goals  Goal: Patient/Family Long Term Goal  Description: Lauren Colindres

## 2021-11-02 NOTE — PLAN OF CARE
Alert & oriented x4. Room air, o2 sats WDL. Normal sinus rhythm/sinus dre on tele monitor, hr in 60s. S/p right groin cryoablation. Groin site soft, no hematoma, dressing intact.  Up and voiding in bathroom, standby assist. Fall precautions in place    Pr

## 2021-11-02 NOTE — DISCHARGE SUMMARY
Adm dx: atrial fibrillation   Disch dx: atrial fibrillation   Atrial fibrillation ablation  Disch cond: stable  Adm date: 11/1/2021  Disch date: 11/2/2021

## 2021-11-02 NOTE — PROGRESS NOTES
[facility] Electrophysiology Progress Note      Andrew Peralta Patient Status:  Observation    1960 MRN [id number]   [facility] 8NE-A Attending Seun West MD   Hosp Day # 0 PCP Vicenta Hoover MD     Subjective:    S/p Date Value Ref Range Status   04/11/2021 2.270 0.358 - 3.740 mIU/mL Final     Comment:      This test may exhibit interference when a sample is collected from a person who is consuming high dose of biotin (a.k.a., vitamin B7, vitamin H, coenzyme R) supple

## 2021-11-16 ENCOUNTER — OFFICE VISIT (OUTPATIENT)
Dept: SURGERY | Facility: CLINIC | Age: 61
End: 2021-11-16
Payer: COMMERCIAL

## 2021-11-16 DIAGNOSIS — S37.019A PERINEPHRIC HEMATOMA: Primary | ICD-10-CM

## 2021-11-16 PROBLEM — I48.0 PAF (PAROXYSMAL ATRIAL FIBRILLATION) (HCC): Status: ACTIVE | Noted: 2021-11-16

## 2021-11-16 PROCEDURE — 99213 OFFICE O/P EST LOW 20 MIN: CPT | Performed by: UROLOGY

## 2021-11-16 NOTE — PROGRESS NOTES
Rooming Clinician:     Hannah Hollingsworth is a 62year old male. Patient presents with: Follow - Up        HPI:     Patient returns for follow-up. Is a history of a left perinephric hematoma after a fall which time he was diagnosed with atrial fibrillation. • CATH DRUG ELUTING STENT     • HERNIA SURGERY     • OTHER      reattachment tendon elbow bilaterl   • REMOVAL OF TONSILS,12+ Y/O     • TONSILLECTOMY        Social History:  Social History    Tobacco Use      Smoking status: Former Smoker      Smokeless tract symptoms       TECHNIQUE:  Multislice CT scanning was performed from the dome of the diaphragm to the iliac crests without and with nonionic intravenous contrast material.  Dose reduction techniques were used.  Dose information is transmitted to the A examination    Diagnoses and all orders for this visit:    Perinephric hematoma        Follow up examination with urology as needed    The patient indicates understanding of these issues and agrees to the plan. Tony Mercado M.D.   Urology

## 2023-06-24 ENCOUNTER — APPOINTMENT (OUTPATIENT)
Dept: GENERAL RADIOLOGY | Age: 63
End: 2023-06-24
Attending: PHYSICIAN ASSISTANT
Payer: COMMERCIAL

## 2023-06-24 ENCOUNTER — HOSPITAL ENCOUNTER (OUTPATIENT)
Age: 63
Discharge: HOME | End: 2023-06-24
Payer: COMMERCIAL

## 2023-06-24 VITALS
DIASTOLIC BLOOD PRESSURE: 86 MMHG | TEMPERATURE: 98 F | BODY MASS INDEX: 28 KG/M2 | OXYGEN SATURATION: 97 % | RESPIRATION RATE: 20 BRPM | WEIGHT: 198 LBS | SYSTOLIC BLOOD PRESSURE: 123 MMHG | HEART RATE: 70 BPM

## 2023-06-24 DIAGNOSIS — R09.82 PND (POST-NASAL DRIP): ICD-10-CM

## 2023-06-24 DIAGNOSIS — R05.8 COUGH PRODUCTIVE OF PURULENT SPUTUM: Primary | ICD-10-CM

## 2023-06-24 PROCEDURE — 99203 OFFICE O/P NEW LOW 30 MIN: CPT | Performed by: PHYSICIAN ASSISTANT

## 2023-06-24 PROCEDURE — 71046 X-RAY EXAM CHEST 2 VIEWS: CPT | Performed by: PHYSICIAN ASSISTANT

## 2023-06-24 RX ORDER — ALBUTEROL SULFATE 90 UG/1
2 AEROSOL, METERED RESPIRATORY (INHALATION) ONCE
Status: COMPLETED | OUTPATIENT
Start: 2023-06-24 | End: 2023-06-24

## 2023-06-24 RX ORDER — DEXAMETHASONE 4 MG/1
12 TABLET ORAL ONCE
Status: COMPLETED | OUTPATIENT
Start: 2023-06-24 | End: 2023-06-24

## 2023-06-24 RX ORDER — MIDODRINE HYDROCHLORIDE 10 MG/1
TABLET ORAL
COMMUNITY
Start: 2022-01-20

## 2023-06-24 RX ORDER — EZETIMIBE 10 MG/1
TABLET ORAL
COMMUNITY
Start: 2023-05-30

## 2023-06-24 NOTE — DISCHARGE INSTRUCTIONS
Please return to the ER/clinic if symptoms worsen. Follow-up with your PCP in 24-48 hours as needed. The Decadron will work in your system the neck several days. He may start the additional prednisone on day 2 or 3 if symptoms persist.  Use your inhaler every 4-6 hours as needed. Sleep more upright. Take an over-the-counter antihistamine daily i.e. Zyrtec. Recommend Chloraseptic spray to help stop the cough trigger reflex. If anything changes i.e. increasing fever shortness of breath go directly to the emergency room. Otherwise follow-up with your primary care physician for further evaluation and treatment.

## 2023-06-28 RX ORDER — PREDNISONE 20 MG/1
40 TABLET ORAL DAILY
Qty: 10 TABLET | Refills: 0 | Status: SHIPPED | OUTPATIENT
Start: 2023-06-28 | End: 2023-07-03

## 2024-05-30 ENCOUNTER — APPOINTMENT (OUTPATIENT)
Dept: GENERAL RADIOLOGY | Age: 64
End: 2024-05-30
Attending: NURSE PRACTITIONER
Payer: COMMERCIAL

## 2024-05-30 ENCOUNTER — HOSPITAL ENCOUNTER (OUTPATIENT)
Age: 64
Discharge: HOME | End: 2024-05-30
Payer: COMMERCIAL

## 2024-05-30 VITALS
DIASTOLIC BLOOD PRESSURE: 81 MMHG | OXYGEN SATURATION: 100 % | SYSTOLIC BLOOD PRESSURE: 128 MMHG | TEMPERATURE: 97 F | HEART RATE: 66 BPM | RESPIRATION RATE: 20 BRPM

## 2024-05-30 DIAGNOSIS — R55 SYNCOPE, NON CARDIAC: ICD-10-CM

## 2024-05-30 DIAGNOSIS — J10.1 INFLUENZA A: Primary | ICD-10-CM

## 2024-05-30 DIAGNOSIS — R31.9 HEMATURIA, UNSPECIFIED TYPE: ICD-10-CM

## 2024-05-30 LAB
#MXD IC: 0.7 X10ˆ3/UL (ref 0.1–1)
BUN BLD-MCNC: 18 MG/DL (ref 7–18)
CHLORIDE BLD-SCNC: 102 MMOL/L (ref 98–112)
CO2 BLD-SCNC: 26 MMOL/L (ref 21–32)
CREAT BLD-MCNC: 1.4 MG/DL
EGFRCR SERPLBLD CKD-EPI 2021: 56 ML/MIN/1.73M2 (ref 60–?)
GLUCOSE BLD-MCNC: 114 MG/DL (ref 70–99)
GLUCOSE UR STRIP-MCNC: NEGATIVE MG/DL
HCT VFR BLD AUTO: 45.4 %
HCT VFR BLD CALC: 48 %
HGB BLD-MCNC: 15.7 G/DL
HGB UR QL STRIP: NEGATIVE
ISTAT IONIZED CALCIUM FOR CHEM 8: 1.19 MMOL/L (ref 1.12–1.32)
KETONES UR STRIP-MCNC: 15 MG/DL
LEUKOCYTE ESTERASE UR QL STRIP: NEGATIVE
LYMPHOCYTES # BLD AUTO: 1.2 X10ˆ3/UL (ref 1–4)
LYMPHOCYTES NFR BLD AUTO: 29.2 %
MCH RBC QN AUTO: 33.3 PG (ref 26–34)
MCHC RBC AUTO-ENTMCNC: 34.6 G/DL (ref 31–37)
MCV RBC AUTO: 96.2 FL (ref 80–100)
MIXED CELL %: 18.1 %
NEUTROPHILS # BLD AUTO: 2.1 X10ˆ3/UL (ref 1.5–7.7)
NEUTROPHILS NFR BLD AUTO: 52.7 %
NITRITE UR QL STRIP: NEGATIVE
PH UR STRIP: 6 [PH]
PLATELET # BLD AUTO: 146 X10ˆ3/UL (ref 150–450)
POCT INFLUENZA A: POSITIVE
POCT INFLUENZA B: NEGATIVE
POTASSIUM BLD-SCNC: 4.3 MMOL/L (ref 3.6–5.1)
PROT UR STRIP-MCNC: 30 MG/DL
RBC # BLD AUTO: 4.72 X10ˆ6/UL
SARS-COV-2 RNA RESP QL NAA+PROBE: NOT DETECTED
SODIUM BLD-SCNC: 138 MMOL/L (ref 136–145)
SP GR UR STRIP: 1.02
TROPONIN I BLD-MCNC: <0.02 NG/ML
UROBILINOGEN UR STRIP-ACNC: <2 MG/DL
WBC # BLD AUTO: 4 X10ˆ3/UL (ref 4–11)

## 2024-05-30 PROCEDURE — 81002 URINALYSIS NONAUTO W/O SCOPE: CPT | Performed by: NURSE PRACTITIONER

## 2024-05-30 PROCEDURE — U0002 COVID-19 LAB TEST NON-CDC: HCPCS | Performed by: NURSE PRACTITIONER

## 2024-05-30 PROCEDURE — 84484 ASSAY OF TROPONIN QUANT: CPT | Performed by: NURSE PRACTITIONER

## 2024-05-30 PROCEDURE — 85025 COMPLETE CBC W/AUTO DIFF WBC: CPT | Performed by: NURSE PRACTITIONER

## 2024-05-30 PROCEDURE — 99213 OFFICE O/P EST LOW 20 MIN: CPT | Performed by: NURSE PRACTITIONER

## 2024-05-30 PROCEDURE — 71046 X-RAY EXAM CHEST 2 VIEWS: CPT | Performed by: NURSE PRACTITIONER

## 2024-05-30 PROCEDURE — 96360 HYDRATION IV INFUSION INIT: CPT | Performed by: NURSE PRACTITIONER

## 2024-05-30 PROCEDURE — 80047 BASIC METABLC PNL IONIZED CA: CPT | Performed by: NURSE PRACTITIONER

## 2024-05-30 PROCEDURE — 93000 ELECTROCARDIOGRAM COMPLETE: CPT | Performed by: NURSE PRACTITIONER

## 2024-05-30 PROCEDURE — 87502 INFLUENZA DNA AMP PROBE: CPT | Performed by: NURSE PRACTITIONER

## 2024-05-30 RX ORDER — SODIUM CHLORIDE 9 MG/ML
1000 INJECTION, SOLUTION INTRAVENOUS ONCE
Status: COMPLETED | OUTPATIENT
Start: 2024-05-30 | End: 2024-05-30

## 2024-05-30 NOTE — DISCHARGE INSTRUCTIONS
Please stay hydrated by drinking plenty of oral fluids.  You are contagious with influenza until you have been fever free for 24 hours with no fever reducing medicines and your symptoms improved.  Follow closely with your primary care provider.  ER if worse.

## 2024-05-30 NOTE — ED PROVIDER NOTES
Patient Seen in: [facility]      History     Chief Complaint   Patient presents with    Cough/URI    Fever     Stated Complaint: fever x Sat    Subjective:   This is a 64-year-old male with below stated medical history.  Presents to immediate care for fevers and URI symptoms with fevers.  Symptoms started Friday.  Patient states his fever broke on Monday and he attempted to go to work on Tuesday.  Reports he passed out in the shower Tuesday morning.  Reports decreased p.o. intake during course of illness.  He denies any head injury.  He denies any chest pain or shortness of breath.  Denies any elevation in heart rate.  States he was able to monitor his rhythm with no evidence of A-fib.  States in the past he has had episodes of syncope that have been directly related to A-fib.  He is not anticoagulated.  He denies any difficulty breathing or wheezing. States a few days ago he also noticed some blood in the front of his underwear.  He denies any urinary symptoms.  He denies any abdominal pain or flank pain.  No treatment attempted prior to arrival.     The history is provided by the patient and the spouse.           Objective:   Past Medical History:    Arrhythmia    Atherosclerosis of coronary artery    Back problem    BPH (benign prostatic hyperplasia)    Coronary atherosclerosis    Dizziness    Epileptic (HCC)    Epileptic episodes    Hearing impairment    High cholesterol    History of angioplasty    Other and unspecified hyperlipidemia    Pneumonia due to organism    Visual impairment              Past Surgical History:   Procedure Laterality Date    Angioplasty (coronary)      Cath drug eluting stent      Hernia surgery      Other      reattachment tendon elbow bilaterl    Removal of tonsils,13+ y/o      Tonsillectomy                  Social History     Socioeconomic History    Marital status:    Occupational History    Occupation:      Employer: [employer]   Tobacco Use    Smoking status: Former    Smokeless tobacco: Never    Tobacco comments:     quit 6 years ago   Vaping Use    Vaping status: Never Used   Substance and Sexual Activity    Alcohol use: Not Currently     Comment: 1-2 drinks on weekends    Drug use: Not Currently     Types: Cannabis     Comment: gummies a few months ago   Other Topics Concern    Caffeine Concern No     Comment: 1 cup coffee daily    Exercise No              Review of Systems   Constitutional:  Positive for chills, fatigue and fever.   HENT:  Positive for congestion and sore throat.    Respiratory:  Positive for cough. Negative for wheezing and stridor.    Cardiovascular:  Negative for chest pain, palpitations and leg swelling.   Gastrointestinal:  Negative for abdominal pain, constipation, diarrhea, nausea and vomiting.   Genitourinary:  Positive for hematuria. Negative for dysuria.   Musculoskeletal:  Negative for back pain, neck pain and neck stiffness.   Skin:  Negative for rash.   Allergic/Immunologic: Negative for environmental allergies.   Neurological:  Positive for syncope. Negative for dizziness, weakness and headaches.   Hematological:  Does not bruise/bleed easily.       Positive for stated complaint: fever x Sat  Other systems are as noted in HPI.  Constitutional and vital signs reviewed.      All other systems reviewed and negative except as noted above.    Physical Exam     ED Triage Vitals [05/30/24 1618]   /83   Pulse 95   Resp 20   Temp 97.1 °F (36.2 °C)   Temp src Temporal   SpO2 98 %   O2 Device None (Room air)       Current Vitals:   Vital Signs  BP: 128/81  Pulse: 66  Resp: 20  Temp: 97.1 °F (36.2 °C)  Temp src: Temporal    Oxygen Therapy  SpO2: 100 %  O2 Device: None (Room air)            Physical Exam  Vitals and nursing note reviewed.   Constitutional:       General: He is not in acute distress.     Appearance: Normal appearance. He is not ill-appearing, toxic-appearing or diaphoretic.    HENT:      Head: Normocephalic and atraumatic.      Right Ear: Tympanic membrane, ear canal and external ear normal. There is no impacted cerumen.      Left Ear: Tympanic membrane, ear canal and external ear normal. There is no impacted cerumen.      Nose: Congestion and rhinorrhea present.      Mouth/Throat:      Mouth: Mucous membranes are moist.      Pharynx: Oropharynx is clear. Posterior oropharyngeal erythema present. No oropharyngeal exudate.   Eyes:      General:         Right eye: No discharge.         Left eye: No discharge.      Extraocular Movements: Extraocular movements intact.      Conjunctiva/sclera: Conjunctivae normal.   Cardiovascular:      Rate and Rhythm: Normal rate and regular rhythm.      Heart sounds: Normal heart sounds. No murmur heard.  Pulmonary:      Effort: Pulmonary effort is normal. No respiratory distress.      Breath sounds: No stridor. Rhonchi present. No wheezing or rales.   Chest:      Chest wall: No tenderness.   Abdominal:      Palpations: Abdomen is soft.      Tenderness: There is no abdominal tenderness. There is no right CVA tenderness or left CVA tenderness.   Musculoskeletal:      Cervical back: Neck supple.      Right lower leg: No edema.      Left lower leg: No edema.   Skin:     General: Skin is warm and dry.      Capillary Refill: Capillary refill takes less than 2 seconds.      Findings: No rash.   Neurological:      Mental Status: He is alert and oriented to person, place, and time.   Psychiatric:         Mood and Affect: Mood normal.         Behavior: Behavior normal.               ED Course     Labs Reviewed   POCT CBC - Abnormal; Notable for the following components:       Result Value    PLT .0 (*)     All other components within normal limits   POCT ISTAT CHEM8 CARTRIDGE - Abnormal; Notable for the following components:    ISTAT Glucose 114 (*)     ISTAT Creatinine 1.40 (*)     eGFR-Cr 56 (*)     All other components within normal limits   [facility] POCT  URINALYSIS DIPSTICK - Abnormal; Notable for the following components:    Urine Color Smitha (*)     Urine Clarity Slightly cloudy (*)     Protein urine 30 (*)     Ketone, Urine 15 (*)     Bilirubin, Urine Small (*)     All other components within normal limits   POCT FLU TEST - Abnormal; Notable for the following components:    POCT INFLUENZA A Positive (*)     All other components within normal limits    Narrative:     This assay is a rapid molecular in vitro test utilizing nucleic acid amplification of influenza A and B viral RNA.   ISTAT TROPONIN - Normal   RAPID SARS-COV-2 BY PCR - Normal     EKG    Rate, intervals and axes as noted on EKG Report.  Rate: 73  Rhythm: Sinus Rhythm  Reading: Normal intervals, no evidence of ST elevation or depression.                 Rapid strep, rapid flu, rapid COVID, labs, EKG, chest x-ray, IV fluid hydration         MDM      Vital signs stable.  Patient is well-appearing and nontoxic looking.  Presents to immediate care for fever with URI symptoms.  Patient also reports a syncopal episode while in the shower 2 days ago.    Differential diagnosis includes was not limited to strep, COVID, influenza, pneumonia, other viral URI, anemia, vasovagal syncope, cardiac event, dehydration    EKG shows no evidence of acute ischemia.  Troponin is negative.  CBC shows no leukocytosis with a stable H&H.  I-STAT shows a creatinine of 1.4 with a bun of 18.  Previous creatinine reviewed from 2021 which was 1.23.  No gross electrolyte abnormality.  UA shows no evidence of blood or infection.    Rapid strep and rapid COVID are negative.  Rapid flu is positive for influenza A.    Chest x-ray films interpreted and reviewed by myself.  Results show no acute focal consolidation concerning for pneumonia.    Likely episode of syncope was due to mild dehydration.  He was hydrated with 1 L of normal saline here in the clinic.    Out of the window for Tamiflu.    [place] home.  The importance of oral hydration  discussed.  Continue Tylenol and ibuprofen for pain or fever.  Close follow-up with PCP for reevaluation reviewed.  Reasons to seek emergent care also reviewed.  Patient and his wife verbalized understanding, and agreed plan of care.  All questions answered.    This case was discussed my attending physician Dr. ISAAK Sutton.  He agreed with plan.  No further recommendations.                               Medical Decision Making      Disposition and Plan     Clinical Impression:  1. Influenza A    2. Syncope, non cardiac    3. Hematuria, unspecified type         Disposition:  Discharge  5/30/2024  6:20 pm    Follow-up:  Travis Shrestha MD  [address] [address]  [phone]    In 1 week            Medications Prescribed:  Current Discharge Medication List

## 2024-05-30 NOTE — ED INITIAL ASSESSMENT (HPI)
Pt c/o 4-6 days of fever, pt c/o cough and scratchy throat. Pt c/o decreased appetite. Pt also c/o blood in his underwear last week , denies blood in his urine.

## 2024-05-31 LAB
ATRIAL RATE: 73 BPM
P AXIS: 30 DEGREES
P-R INTERVAL: 142 MS
Q-T INTERVAL: 382 MS
QRS DURATION: 84 MS
QTC CALCULATION (BEZET): 420 MS
R AXIS: 41 DEGREES
T AXIS: 21 DEGREES
VENTRICULAR RATE: 73 BPM

## 2024-07-16 ENCOUNTER — APPOINTMENT (OUTPATIENT)
Dept: ULTRASOUND IMAGING | Facility: HOSPITAL | Age: 64
End: 2024-07-16
Attending: EMERGENCY MEDICINE
Payer: COMMERCIAL

## 2024-07-16 ENCOUNTER — HOSPITAL ENCOUNTER (EMERGENCY)
Facility: HOSPITAL | Age: 64
Discharge: HOME | End: 2024-07-16
Attending: EMERGENCY MEDICINE
Payer: COMMERCIAL

## 2024-07-16 VITALS
DIASTOLIC BLOOD PRESSURE: 83 MMHG | TEMPERATURE: 99 F | RESPIRATION RATE: 17 BRPM | OXYGEN SATURATION: 100 % | HEART RATE: 52 BPM | SYSTOLIC BLOOD PRESSURE: 131 MMHG

## 2024-07-16 DIAGNOSIS — M79.604 RIGHT LEG PAIN: Primary | ICD-10-CM

## 2024-07-16 PROCEDURE — 93971 EXTREMITY STUDY: CPT | Performed by: EMERGENCY MEDICINE

## 2024-07-16 PROCEDURE — 99284 EMERGENCY DEPT VISIT MOD MDM: CPT

## 2024-07-16 NOTE — ED PROVIDER NOTES
Patient Seen in: [facility]      History     Chief Complaint   Patient presents with    Leg Pain     Stated Complaint: right leg throbbing pain    Subjective:   64-year-old male, history of paroxysmal atrial fibrillation status post ablation, presents with right leg pain.  States yesterday his legs crossed and after he uncrossed and felt some throbbing in his right shin region.  States he can get better then woke up overnight with the same pain to come in for evaluation.  His wife has history of DVT and PE she was concerned for his symptoms.  Sensitive to touch over the right anterior medial lower leg.  Started also with a bruising there as well.  No numbness or weakness.  No swelling.  No history of DVT or PE.  No chest pain cough or shortness of breath.  No pleurisy or hemoptysis.  No other complaints.  Does not want anything for pain            Objective:   Past Medical History:    Arrhythmia    Atherosclerosis of coronary artery    Back problem    BPH (benign prostatic hyperplasia)    Coronary atherosclerosis    Dizziness    Epileptic (HCC)    Epileptic episodes    Hearing impairment    High cholesterol    History of angioplasty    Other and unspecified hyperlipidemia    Pneumonia due to organism    Visual impairment              Past Surgical History:   Procedure Laterality Date    Angioplasty (coronary)      Cath drug eluting stent      Hernia surgery      Other      reattachment tendon elbow bilaterl    Removal of tonsils,13+ y/o      Tonsillectomy                  Social History     Socioeconomic History    Marital status:    Occupational History    Occupation:      Employer: [employer]   Tobacco Use    Smoking status: Former    Smokeless tobacco: Never    Tobacco comments:     quit 6 years ago   Vaping Use    Vaping status: Never Used   Substance and Sexual Activity    Alcohol use: Yes     Comment: 1-2 drinks on weekends    Drug use: Not  Currently     Types: Cannabis     Comment: gummies a few months ago   Other Topics Concern    Caffeine Concern No     Comment: 1 cup coffee daily    Exercise No              Review of Systems   Constitutional:  Negative for fever.   Respiratory:  Negative for shortness of breath.    Cardiovascular:  Negative for chest pain.   Musculoskeletal:  Positive for myalgias.   Skin:  Negative for rash.   Neurological:  Negative for weakness and numbness.   Hematological:  Does not bruise/bleed easily.       Positive for stated Chief Complaint: Leg Pain    Other systems are as noted in HPI.  Constitutional and vital signs reviewed.      All other systems reviewed and negative except as noted above.    Physical Exam     ED Triage Vitals   BP 07/16/24 0623 144/84   Pulse 07/16/24 0623 58   Resp 07/16/24 0623 20   Temp 07/16/24 0624 98.8 °F (37.1 °C)   Temp src 07/16/24 0624 Oral   SpO2 07/16/24 0623 99 %   O2 Device 07/16/24 0623 None (Room air)       Current Vitals:   Vital Signs  BP: 131/83  Pulse: 52  Resp: 17  Temp: 98.8 °F (37.1 °C)  Temp src: Oral  MAP (mmHg): (!) 104    Oxygen Therapy  SpO2: 100 %  O2 Device: None (Room air)            Physical Exam  Vitals and nursing note reviewed.   Constitutional:       Appearance: He is not toxic-appearing.   HENT:      Head: Normocephalic.   Cardiovascular:      Rate and Rhythm: Normal rate and regular rhythm.      Heart sounds: Normal heart sounds.   Pulmonary:      Effort: Pulmonary effort is normal. No respiratory distress.      Breath sounds: Normal breath sounds.   Abdominal:      General: There is no distension.      Palpations: Abdomen is soft.      Tenderness: There is no abdominal tenderness.   Musculoskeletal:         General: Tenderness present. No swelling or deformity. Normal range of motion.      Cervical back: Neck supple.   Skin:     Findings: Bruising present. No erythema or rash.      Comments: Slight superficial bruising noted along the right anterior shin/tibia  region.  He is sensitive to touch over that region with light touch.  There is no swelling or edema.   Neurological:      General: No focal deficit present.      Mental Status: He is alert.      Sensory: No sensory deficit.      Coordination: Coordination normal.   Psychiatric:         Mood and Affect: Mood normal.         Behavior: Behavior normal.               ED Course   Labs Reviewed - No data to display                   MDM      US VENOUS DOPPLER LEG RIGHT - DIAG IMG (CPT=93971)    Result Date: 7/16/2024  CONCLUSION:  Negative for deep venous thrombosis of the right lower extremity.   LOCATION:  [facility]    Dictated by (CST): Deacon Perez MD on 7/16/2024 at 8:56 AM     Finalized by (CST): Deacon Perez MD on 7/16/2024 at 8:57 AM        I independently interpreted the ultrasound of the right lower extremity found any obvious signs of occlusive DVT    Differential diagnosis includes, but is not limited to, DVT, contusion, sprain, spasm, shinsplints    Wife at bedside helpful to provide information on the history presenting illness    External chart review demonstrates outpatient visit with cardiology in January this year      64-year-old male with some superficial tenderness to the right anterior tibial region.  Distal pulses are intact.  No swelling.  No edema.  No discoloration.  No clinical signs of arterial occlusion.  No calf pain on exam.  Ultrasound of the calf is negative for DVT.  No blunt trauma.  Neurovascular intact.  Wanted to ensure it was not a DVT because of wife's history as well.  He is well-appearing nontoxic.  Sinus rhythm on the monitor.  Discharge home at his request, shared decision making utilized and return precaution provided.  Repeat ultrasound in about a week if persistent pain or symptoms or worsening    Patient was screened and evaluated during this visit.  As the treating physician attending to the patient, I determined within reasonable clinical confidence and prior to discharge, that  an emergency medical condition was not or was no longer present.  There was no indication for further evaluation, treatment, or admission on an emergency basis.  Comprehensive verbal and written discharge and follow-up instructions were provided to help prevent relapse or worsening.  Patient was instructed to follow-up with their primary care provider for further evaluation and treatment, return immediately to ER for worsening, concerning, new, or changing/persisting symptoms. I discussed the case with the patient and they had no questions, complaints, or concerns.  Patient was comfortable going home.     Per the discharge paperwork, patients are encouraged to and given instructions on how to [facility], where they have access to their records, including any/all incidental findings.     This note was prepared using Dragon Medical voice recognition dictation software. As a result errors may occur. When identified these errors have been corrected. While every attempt is made to correct errors during dictation discrepancies may still exist    Note to patient: The 21st Century Cures Act makes medical notes like these available to patients in the interest of transparency. However, this is a medical document intended as peer to peer communication. It is written in medical language and may contain abbreviations or verbiage that are unfamiliar. It may appear blunt or direct. Medical documents are intended to carry relevant information, facts as evident, and the clinical opinion of the practitioner.                      Medical Decision Making      Disposition and Plan     Clinical Impression:  1. Right leg pain         Disposition:  Discharge  7/16/2024  9:25 am    Follow-up:  Nghia Garcia MD  [address] [address]  [phone]    Follow up            Medications Prescribed:  Discharge Medication List as of 7/16/2024  9:25 AM

## 2024-07-16 NOTE — ED QUICK NOTES
Patient care assumed. Patient sitting on cart in no distress. Denies discomfort at this time. Wife at the bedside. Awaiting ultrasound.

## 2024-07-22 ENCOUNTER — OFFICE VISIT (OUTPATIENT)
Facility: LOCATION | Age: 64
End: 2024-07-22
Payer: COMMERCIAL

## 2024-07-22 VITALS — TEMPERATURE: 98 F | HEART RATE: 63 BPM

## 2024-07-22 DIAGNOSIS — R10.33 UMBILICAL PAIN: Primary | ICD-10-CM

## 2024-07-22 NOTE — H&P
New Patient Visit Note       Active Problems      1. Umbilical pain        Chief Complaint   Chief Complaint   Patient presents with    New Patient     NP- Umbilical Hernia- pt reports umbilical hernia surgery about 20 years ago, feels mild pain around umbilicus        History of Present Illness   This is a very nice 64-year-old gentleman who presents to me with umbilical pain and concern for a possible recurrent umbilical hernia.  Patient underwent an umbilical hernia repair around 20 years ago.  He believes a mesh was placed at that time.  Around 2 to 3 weeks ago, patient's pitbull jumped on patient's abdomen and caused pain beneath the patient's umbilicus.  Patient has noticed ongoing pain beneath the umbilicus with activity since that time.  He has not noticed any obvious bulge beneath the umbilicus.  No obstructive symptoms.  Patient takes 81 mg of aspirin daily, no other blood thinners.      Allergies  Allan is allergic to amiodarone and bee venom.    Past Medical / Surgical / Social / Family History    The past medical and past surgical history have been reviewed by me today.    Past Medical History:    Arrhythmia    Atherosclerosis of coronary artery    Back problem    BPH (benign prostatic hyperplasia)    Coronary atherosclerosis    Dizziness    Epileptic (HCC)    Epileptic episodes    Hearing impairment    High cholesterol    History of angioplasty    Other and unspecified hyperlipidemia    Pneumonia due to organism    Visual impairment     Past Surgical History:   Procedure Laterality Date    Angioplasty (coronary)      Cath drug eluting stent      Hernia surgery      Other      reattachment tendon elbow bilaterl    Removal of tonsils,13+ y/o      Tonsillectomy         The family history and social history have been reviewed by me today.    Family History   Problem Relation Age of Onset    Heart Disease Father     Cancer Father     Heart Disease Mother     Hypertension Brother     Cancer Brother       Social History     Socioeconomic History    Marital status:    Occupational History    Occupation:      Employer: [employer]   Tobacco Use    Smoking status: Former    Smokeless tobacco: Never    Tobacco comments:     quit 6 years ago   Vaping Use    Vaping status: Never Used   Substance and Sexual Activity    Alcohol use: Yes     Comment: 1-2 drinks on weekends    Drug use: Not Currently     Types: Cannabis     Comment: gummies a few months ago   Other Topics Concern    Caffeine Concern No     Comment: 1 cup coffee daily    Exercise No        Current Outpatient Medications:     Aspirin 81 MG Oral Cap, , Disp: , Rfl:     ezetimibe 10 MG Oral Tab, , Disp: , Rfl:     midodrine 10 MG Oral Tab, , Disp: , Rfl:     TURMERIC OR, Take 250 mg by mouth As Directed., Disp: , Rfl:     sotalol 120 MG Oral Tab, Take 0.5 tablets (60 mg total) by mouth 2 (two) times daily. (Patient not taking: Reported on 6/24/2023), Disp: 60 tablet, Rfl: 5    Multiple Vitamin (TAB-A-SOPHY) Oral Tab, Take 1 tablet by mouth daily., Disp: , Rfl:     ascorbic acid, VITAMIN C, 250 MG Oral Tab, Take 1 tablet (250 mg total) by mouth daily., Disp: , Rfl:       Review of Systems  A 10 point review of systems was performed and negative unless otherwise documented per HPI.    Physical Findings   Pulse 63   Temp 97.8 °F (36.6 °C) (Temporal)   Physical Exam  Vitals and nursing note reviewed. Exam conducted with a chaperone present.   Constitutional:       General: He is not in acute distress.  HENT:      Head: Normocephalic and atraumatic.      Mouth/Throat:      Mouth: Mucous membranes are moist.   Cardiovascular:      Rate and Rhythm: Normal rate and regular rhythm.   Pulmonary:      Effort: Pulmonary effort is normal.   Abdominal:      General: There is no distension.      Palpations: Abdomen is soft.      Tenderness: There is abdominal tenderness (Beneath the umbilicus).      Comments: Well-healed infraumbilical  surgical scar.  No obvious umbilical hernia appreciated on exam though there is perhaps a small, subtle bulge and tenderness when palpating over the umbilicus with abdominal flexion   Musculoskeletal:         General: No deformity.   Skin:     General: Skin is warm and dry.   Neurological:      General: No focal deficit present.      Mental Status: He is alert.   Psychiatric:         Mood and Affect: Mood normal.             Assessment   1. Umbilical pain        This is a very nice 64-year-old gentleman who presents to me with umbilical pain and concern for a possible recurrent umbilical hernia.  Patient underwent an umbilical hernia repair around 20 years ago.  He believes a mesh was placed at that time.  Around 2 to 3 weeks ago, patient's pitbull jumped on patient's abdomen and caused pain beneath the patient's umbilicus.  Patient has noticed ongoing pain beneath the umbilicus with activity since that time.  He has not noticed any obvious bulge beneath the umbilicus.  No obstructive symptoms.  Patient takes 81 mg of aspirin daily, no other blood thinners.    On exam today, there is a well-healed infraumbilical surgical scar.  No obvious umbilical hernia appreciated though there is perhaps a small, subtle bulge and tenderness when palpating over the umbilicus with abdominal flexion     Plan  I counseled patient that it is possible he may have an abdominal muscle strain versus small recurrence of the umbilical hernia.  I recommend obtaining a CT scan of the abdomen and pelvis to rule out hernia recurrence if the pain/discomfort beneath the umbilicus persists over the next 1 to 2 weeks.  In the meantime, I suggest resting the abdominal wall is much as possible, applying ice packs and taking non-narcotic anti-inflammatory pain medication as needed.  Patient has been scheduled for a follow-up appointment on 9/16/2024.  Return precautions in the meantime discussed.  Patient expressed understanding and was agreeable to  plan.     No orders of the defined types were placed in this encounter.      Imaging & Referrals   CT ABDOMEN+PELVIS(CPT=74176)    Follow Up  No follow-ups on file.    Quintin Pagan MD

## 2024-08-06 ENCOUNTER — HOSPITAL ENCOUNTER (OUTPATIENT)
Dept: CT IMAGING | Age: 64
Discharge: HOME | End: 2024-08-06
Attending: STUDENT IN AN ORGANIZED HEALTH CARE EDUCATION/TRAINING PROGRAM
Payer: COMMERCIAL

## 2024-08-06 DIAGNOSIS — R10.33 UMBILICAL PAIN: ICD-10-CM

## 2024-08-06 PROCEDURE — 74176 CT ABD & PELVIS W/O CONTRAST: CPT | Performed by: STUDENT IN AN ORGANIZED HEALTH CARE EDUCATION/TRAINING PROGRAM

## 2024-08-31 NOTE — H&P
HPI:     Sherine Brody is a 62year old male with a PMH of epilepsy, HL, CAD, LBP with left sided sciatica, BPH/LUTS. He presents as a consult from Dr Brooke Hood office with left testicular pain.     PCP - [facility]    Onset: 3 weeks and waxing/waning, had t packs. We also discussed a two week course of abx and he would like to do this. I strongly recommend he be evaluated again for the LBP with left-sided sciatica and discussed this certainly can increase risk for left testicular pain.     I would also jaswinder noted in the the HPI.     PHYSICAL EXAM:     GENERAL APPEARANCE: well, developed, well nourished, in no acute distress  NEUROLOGIC: nonfocal, alert and oriented  HEAD: normocephalic, atraumatic  EYES: sclera non-icteric  EARS: hearing intact  ORAL CAVITY: m unknown

## 2024-09-16 ENCOUNTER — OFFICE VISIT (OUTPATIENT)
Facility: LOCATION | Age: 64
End: 2024-09-16
Payer: COMMERCIAL

## 2024-09-16 VITALS — HEART RATE: 65 BPM | TEMPERATURE: 98 F | OXYGEN SATURATION: 98 %

## 2024-09-16 DIAGNOSIS — K42.9 UMBILICAL HERNIA WITHOUT OBSTRUCTION AND WITHOUT GANGRENE: Primary | ICD-10-CM

## 2024-09-16 RX ORDER — ATORVASTATIN CALCIUM 80 MG/1
80 TABLET, FILM COATED ORAL NIGHTLY
COMMUNITY
Start: 2022-01-15 | End: 2025-07-21

## 2024-09-16 RX ORDER — ZOLPIDEM TARTRATE 10 MG/1
10 TABLET ORAL NIGHTLY PRN
COMMUNITY

## 2024-09-17 NOTE — PROGRESS NOTES
Follow Up Visit Note       Active Problems      1. Umbilical hernia without obstruction and without gangrene          Chief Complaint   Chief Complaint   Patient presents with    Follow - Up     FU - follow up after CT umbilical hernia, slight pain.       History of Present Illness  This is a very nice 64-year-old gentleman who I met on 7/22/2024 when he presented to me with umbilical pain and concern for a possible recurrent umbilical hernia. Patient underwent an umbilical hernia repair around 20 years ago. He believes a mesh was placed at that time.  Patient returns for follow-up today after obtaining a CT scan of the abdomen and pelvis.    CT scan of the abdomen and pelvis performed on 8/6/2024 revealed a tiny fat-containing supraumbilical ventral abdominal wall hernia just above the umbilicus.  There was also an incidental finding of a small, fat-containing right inguinal hernia.  Patient denies any bulge or pain in the right groin area.  He continues to complain of discomfort and mild pain with activity beneath his umbilicus.  No obstructive symptoms.      Allergies  Allan is allergic to amiodarone and bee venom.    Past Medical / Surgical / Social / Family History    The past medical and past surgical history have been reviewed by me today.    Past Medical History:    Arrhythmia    Atherosclerosis of coronary artery    Back problem    BPH (benign prostatic hyperplasia)    Coronary atherosclerosis    Dizziness    Epileptic (HCC)    Epileptic episodes    Hearing impairment    High cholesterol    History of angioplasty    Other and unspecified hyperlipidemia    Pneumonia due to organism    Visual impairment     Past Surgical History:   Procedure Laterality Date    Angioplasty (coronary)      Cath drug eluting stent      Hernia surgery      Other      reattachment tendon elbow bilaterl    Removal of tonsils,13+ y/o      Replacement prosthetic aortic valve w/ cardiopulmonary bypass; w/ stent< tissue valve       Tonsillectomy         The family history and social history have been reviewed by me today.    Family History   Problem Relation Age of Onset    Heart Disease Father         Lung Cancer    Cancer Father     Heart Disease Mother         Emphysema    Hypertension Brother     Cancer Brother     Heart Disease Brother         Rare Blood Cancer     Social History     Socioeconomic History    Marital status:    Occupational History    Occupation:      Employer: [employer]   Tobacco Use    Smoking status: Former     Current packs/day: 0.00     Average packs/day: 1 pack/day for 25.0 years (25.0 ttl pk-yrs)     Types: Cigarettes     Quit date: 2004     Years since quittin.3    Smokeless tobacco: Never    Tobacco comments:     quit 6 years ago   Vaping Use    Vaping status: Never Used   Substance and Sexual Activity    Alcohol use: Yes     Alcohol/week: 2.0 standard drinks of alcohol     Types: 2 Glasses of wine per week     Comment: 1-2 drinks on weekends    Drug use: Not Currently     Types: Cannabis     Comment: gummies a few months ago   Other Topics Concern    Caffeine Concern No    Exercise No        Current Outpatient Medications:     atorvastatin 80 MG Oral Tab, Take 1 tablet (80 mg total) by mouth nightly., Disp: , Rfl:     zolpidem 10 MG Oral Tab, Take 1 tablet (10 mg total) by mouth nightly as needed for Sleep., Disp: , Rfl:     Aspirin 81 MG Oral Cap, , Disp: , Rfl:     TURMERIC OR, Take 250 mg by mouth As Directed., Disp: , Rfl:      Review of Systems  A 10 point review of systems was performed and negative unless otherwise documented per HPI.     Physical Findings   Pulse 65   Temp 98.2 °F (36.8 °C) (Temporal)   SpO2 98%   Physical Exam  Vitals and nursing note reviewed. Exam conducted with a chaperone present.   Constitutional:       General: He is not in acute distress.  HENT:      Head: Normocephalic and atraumatic.      Mouth/Throat:      Mouth: Mucous  membranes are moist.   Cardiovascular:      Rate and Rhythm: Normal rate and regular rhythm.   Pulmonary:      Effort: Pulmonary effort is normal.   Abdominal:      General: There is no distension.      Palpations: Abdomen is soft.      Tenderness: There is no abdominal tenderness.      Hernia: A hernia (There is suggestion of a small, reducible umbilical hernia) is present.   Musculoskeletal:         General: No deformity.   Skin:     General: Skin is warm and dry.   Neurological:      General: No focal deficit present.      Mental Status: He is alert.   Psychiatric:         Mood and Affect: Mood normal.     I have personally reviewed the patient's CT scan of the abdomen and pelvis from 8/6/2024.  I showed the imaging to the patient at bedside.     Assessment   1. Umbilical hernia without obstruction and without gangrene      This is a very nice 64-year-old gentleman who I met on 7/22/2024 when he presented to me with umbilical pain and concern for a possible recurrent umbilical hernia. Patient underwent an umbilical hernia repair around 20 years ago. He believes a mesh was placed at that time.  Patient returns for follow-up today after obtaining a CT scan of the abdomen and pelvis.    CT scan of the abdomen and pelvis performed on 8/6/2024 revealed a tiny fat-containing supraumbilical ventral abdominal wall hernia just above the umbilicus.  There was also an incidental finding of a small, fat-containing right inguinal hernia.  Patient denies any bulge or pain in the right groin area.  He continues to complain of discomfort and mild pain with activity beneath his umbilicus.  No obstructive symptoms.    Plan   Given patient's ongoing umbilical pain and CT findings suggestive of a small, recurrent, fat-containing umbilical hernia, I suggest planning to go to the operating room for robotic umbilical hernia repair with possible mesh, possible open.  The details of the surgery were discussed including the expected  recovery time, risks, benefits and alternatives.  Patient expressed understanding and was agreeable to schedule surgery.  Surgery has been scheduled for 12/11/2024 pending cardiac clearance.    In the meantime, I advised patient to go to the emergency room with worsening pain, obstructive symptoms or any other new severe symptoms.  Patient expressed understanding and was agreeable to plan.     No orders of the defined types were placed in this encounter.      Imaging & Referrals   None    Follow Up  No follow-ups on file.    Quintin Pagan MD

## 2024-11-26 ENCOUNTER — TELEPHONE (OUTPATIENT)
Facility: LOCATION | Age: 64
End: 2024-11-26

## 2024-11-26 NOTE — TELEPHONE ENCOUNTER
Your request for [id number] has been submitted. The following procedures do not require an authorization due to the reasons given below based on member's group information benefits and service type.       Procedure code  Description   Auth Required Verification   Reason  [id number]  REPAIR OF ANTERIOR ABDOMINAL HERNIAS IE EPIGASTRIC INCISIONAL VENTRAL UMBILICAL SPIGELIAN ANY APPROACH IE OPEN LAPAROSCOPIC ROBOTIC INITIAL INCLUDING IMPLANTATION OF MESH OR OTHER PROSTHESIS WHEN PERFORMED TOTAL LENGTH OF DEFECTS LESS THAN 3 CM REDUCIBLE  Cancellation  Authorization Not Required

## 2024-12-10 ENCOUNTER — ANESTHESIA EVENT (OUTPATIENT)
Dept: SURGERY | Facility: HOSPITAL | Age: 64
End: 2024-12-10
Payer: COMMERCIAL

## 2024-12-11 ENCOUNTER — ANESTHESIA (OUTPATIENT)
Dept: SURGERY | Facility: HOSPITAL | Age: 64
End: 2024-12-11
Payer: COMMERCIAL

## 2024-12-11 ENCOUNTER — HOSPITAL ENCOUNTER (OUTPATIENT)
Facility: HOSPITAL | Age: 64
Setting detail: HOSPITAL OUTPATIENT SURGERY
Discharge: HOME | End: 2024-12-11
Attending: STUDENT IN AN ORGANIZED HEALTH CARE EDUCATION/TRAINING PROGRAM | Admitting: STUDENT IN AN ORGANIZED HEALTH CARE EDUCATION/TRAINING PROGRAM
Payer: COMMERCIAL

## 2024-12-11 VITALS
TEMPERATURE: 98 F | BODY MASS INDEX: 27.44 KG/M2 | DIASTOLIC BLOOD PRESSURE: 71 MMHG | RESPIRATION RATE: 16 BRPM | HEIGHT: 71 IN | SYSTOLIC BLOOD PRESSURE: 127 MMHG | OXYGEN SATURATION: 97 % | WEIGHT: 196 LBS | HEART RATE: 77 BPM

## 2024-12-11 DIAGNOSIS — G89.18 POST-OPERATIVE PAIN: Primary | ICD-10-CM

## 2024-12-11 PROBLEM — K42.9 UMBILICAL HERNIA WITHOUT OBSTRUCTION AND WITHOUT GANGRENE: Status: ACTIVE | Noted: 2024-12-11

## 2024-12-11 PROCEDURE — 8E0W0CZ ROBOTIC ASSISTED PROCEDURE OF TRUNK REGION, OPEN APPROACH: ICD-10-PCS | Performed by: STUDENT IN AN ORGANIZED HEALTH CARE EDUCATION/TRAINING PROGRAM

## 2024-12-11 PROCEDURE — 0WUF0JZ SUPPLEMENT ABDOMINAL WALL WITH SYNTHETIC SUBSTITUTE, OPEN APPROACH: ICD-10-PCS | Performed by: STUDENT IN AN ORGANIZED HEALTH CARE EDUCATION/TRAINING PROGRAM

## 2024-12-11 DEVICE — GORE SYNECOR PREPERITONEAL 10CMX15CM OVAL BIOMATERIAL
Type: IMPLANTABLE DEVICE | Site: ABDOMEN | Status: FUNCTIONAL
Brand: GORE SYNECOR PREPERITONEAL BIOMATERIAL

## 2024-12-11 RX ORDER — DEXAMETHASONE SODIUM PHOSPHATE 4 MG/ML
VIAL (ML) INJECTION AS NEEDED
Status: DISCONTINUED | OUTPATIENT
Start: 2024-12-11 | End: 2024-12-11 | Stop reason: SURG

## 2024-12-11 RX ORDER — ONDANSETRON 2 MG/ML
INJECTION INTRAMUSCULAR; INTRAVENOUS AS NEEDED
Status: DISCONTINUED | OUTPATIENT
Start: 2024-12-11 | End: 2024-12-11 | Stop reason: SURG

## 2024-12-11 RX ORDER — HYDROMORPHONE HYDROCHLORIDE 1 MG/ML
0.4 INJECTION, SOLUTION INTRAMUSCULAR; INTRAVENOUS; SUBCUTANEOUS EVERY 5 MIN PRN
Status: DISCONTINUED | OUTPATIENT
Start: 2024-12-11 | End: 2024-12-11

## 2024-12-11 RX ORDER — LIDOCAINE HYDROCHLORIDE 10 MG/ML
INJECTION, SOLUTION EPIDURAL; INFILTRATION; INTRACAUDAL; PERINEURAL AS NEEDED
Status: DISCONTINUED | OUTPATIENT
Start: 2024-12-11 | End: 2024-12-11 | Stop reason: SURG

## 2024-12-11 RX ORDER — PROCHLORPERAZINE EDISYLATE 5 MG/ML
5 INJECTION INTRAMUSCULAR; INTRAVENOUS EVERY 8 HOURS PRN
Status: DISCONTINUED | OUTPATIENT
Start: 2024-12-11 | End: 2024-12-11

## 2024-12-11 RX ORDER — MIDAZOLAM HYDROCHLORIDE 1 MG/ML
1 INJECTION INTRAMUSCULAR; INTRAVENOUS EVERY 5 MIN PRN
Status: DISCONTINUED | OUTPATIENT
Start: 2024-12-11 | End: 2024-12-11

## 2024-12-11 RX ORDER — HYDROMORPHONE HYDROCHLORIDE 1 MG/ML
0.2 INJECTION, SOLUTION INTRAMUSCULAR; INTRAVENOUS; SUBCUTANEOUS EVERY 5 MIN PRN
Status: DISCONTINUED | OUTPATIENT
Start: 2024-12-11 | End: 2024-12-11

## 2024-12-11 RX ORDER — EPHEDRINE SULFATE 50 MG/ML
INJECTION INTRAVENOUS AS NEEDED
Status: DISCONTINUED | OUTPATIENT
Start: 2024-12-11 | End: 2024-12-11 | Stop reason: SURG

## 2024-12-11 RX ORDER — SODIUM CHLORIDE, SODIUM LACTATE, POTASSIUM CHLORIDE, CALCIUM CHLORIDE 600; 310; 30; 20 MG/100ML; MG/100ML; MG/100ML; MG/100ML
INJECTION, SOLUTION INTRAVENOUS CONTINUOUS
Status: DISCONTINUED | OUTPATIENT
Start: 2024-12-11 | End: 2024-12-11

## 2024-12-11 RX ORDER — NALOXONE HYDROCHLORIDE 0.4 MG/ML
0.08 INJECTION, SOLUTION INTRAMUSCULAR; INTRAVENOUS; SUBCUTANEOUS AS NEEDED
Status: DISCONTINUED | OUTPATIENT
Start: 2024-12-11 | End: 2024-12-11

## 2024-12-11 RX ORDER — HYDROMORPHONE HYDROCHLORIDE 1 MG/ML
0.6 INJECTION, SOLUTION INTRAMUSCULAR; INTRAVENOUS; SUBCUTANEOUS EVERY 5 MIN PRN
Status: DISCONTINUED | OUTPATIENT
Start: 2024-12-11 | End: 2024-12-11

## 2024-12-11 RX ORDER — ACETAMINOPHEN 500 MG
1000 TABLET ORAL ONCE AS NEEDED
Status: COMPLETED | OUTPATIENT
Start: 2024-12-11 | End: 2024-12-11

## 2024-12-11 RX ORDER — ONDANSETRON 2 MG/ML
4 INJECTION INTRAMUSCULAR; INTRAVENOUS EVERY 6 HOURS PRN
Status: DISCONTINUED | OUTPATIENT
Start: 2024-12-11 | End: 2024-12-11

## 2024-12-11 RX ORDER — LABETALOL HYDROCHLORIDE 5 MG/ML
5 INJECTION, SOLUTION INTRAVENOUS EVERY 5 MIN PRN
Status: DISCONTINUED | OUTPATIENT
Start: 2024-12-11 | End: 2024-12-11

## 2024-12-11 RX ORDER — BUPIVACAINE HYDROCHLORIDE 2.5 MG/ML
INJECTION, SOLUTION EPIDURAL; INFILTRATION; INTRACAUDAL AS NEEDED
Status: DISCONTINUED | OUTPATIENT
Start: 2024-12-11 | End: 2024-12-11 | Stop reason: HOSPADM

## 2024-12-11 RX ORDER — HEPARIN SODIUM 5000 [USP'U]/ML
5000 INJECTION, SOLUTION INTRAVENOUS; SUBCUTANEOUS ONCE
Status: COMPLETED | OUTPATIENT
Start: 2024-12-11 | End: 2024-12-11

## 2024-12-11 RX ORDER — ROCURONIUM BROMIDE 10 MG/ML
INJECTION, SOLUTION INTRAVENOUS AS NEEDED
Status: DISCONTINUED | OUTPATIENT
Start: 2024-12-11 | End: 2024-12-11 | Stop reason: SURG

## 2024-12-11 RX ORDER — NEOSTIGMINE METHYLSULFATE 1 MG/ML
INJECTION INTRAVENOUS AS NEEDED
Status: DISCONTINUED | OUTPATIENT
Start: 2024-12-11 | End: 2024-12-11 | Stop reason: SURG

## 2024-12-11 RX ORDER — GLYCOPYRROLATE 0.2 MG/ML
INJECTION, SOLUTION INTRAMUSCULAR; INTRAVENOUS AS NEEDED
Status: DISCONTINUED | OUTPATIENT
Start: 2024-12-11 | End: 2024-12-11 | Stop reason: SURG

## 2024-12-11 RX ORDER — SCOLOPAMINE TRANSDERMAL SYSTEM 1 MG/1
1 PATCH, EXTENDED RELEASE TRANSDERMAL ONCE
Status: DISCONTINUED | OUTPATIENT
Start: 2024-12-11 | End: 2024-12-11 | Stop reason: HOSPADM

## 2024-12-11 RX ORDER — HYDROCODONE BITARTRATE AND ACETAMINOPHEN 5; 325 MG/1; MG/1
1 TABLET ORAL EVERY 6 HOURS PRN
Qty: 15 TABLET | Refills: 0 | Status: SHIPPED | OUTPATIENT
Start: 2024-12-11

## 2024-12-11 RX ORDER — KETOROLAC TROMETHAMINE 30 MG/ML
INJECTION, SOLUTION INTRAMUSCULAR; INTRAVENOUS AS NEEDED
Status: DISCONTINUED | OUTPATIENT
Start: 2024-12-11 | End: 2024-12-11 | Stop reason: SURG

## 2024-12-11 RX ORDER — HYDROCODONE BITARTRATE AND ACETAMINOPHEN 5; 325 MG/1; MG/1
1 TABLET ORAL ONCE AS NEEDED
Status: COMPLETED | OUTPATIENT
Start: 2024-12-11 | End: 2024-12-11

## 2024-12-11 RX ORDER — HYDROCODONE BITARTRATE AND ACETAMINOPHEN 5; 325 MG/1; MG/1
2 TABLET ORAL ONCE AS NEEDED
Status: COMPLETED | OUTPATIENT
Start: 2024-12-11 | End: 2024-12-11

## 2024-12-11 RX ORDER — ACETAMINOPHEN 500 MG
1000 TABLET ORAL ONCE
Status: DISCONTINUED | OUTPATIENT
Start: 2024-12-11 | End: 2024-12-11 | Stop reason: HOSPADM

## 2024-12-11 RX ORDER — HEPARIN SODIUM 5000 [USP'U]/ML
INJECTION, SOLUTION INTRAVENOUS; SUBCUTANEOUS
Status: COMPLETED
Start: 2024-12-11 | End: 2024-12-11

## 2024-12-11 RX ORDER — MEPERIDINE HYDROCHLORIDE 25 MG/ML
12.5 INJECTION INTRAMUSCULAR; INTRAVENOUS; SUBCUTANEOUS AS NEEDED
Status: DISCONTINUED | OUTPATIENT
Start: 2024-12-11 | End: 2024-12-11

## 2024-12-11 RX ADMIN — SODIUM CHLORIDE, SODIUM LACTATE, POTASSIUM CHLORIDE, CALCIUM CHLORIDE: 600; 310; 30; 20 INJECTION, SOLUTION INTRAVENOUS at 16:05:00

## 2024-12-11 RX ADMIN — ROCURONIUM BROMIDE 10 MG: 10 INJECTION, SOLUTION INTRAVENOUS at 15:30:00

## 2024-12-11 RX ADMIN — LIDOCAINE HYDROCHLORIDE 50 MG: 10 INJECTION, SOLUTION EPIDURAL; INFILTRATION; INTRACAUDAL; PERINEURAL at 14:03:00

## 2024-12-11 RX ADMIN — KETOROLAC TROMETHAMINE 30 MG: 30 INJECTION, SOLUTION INTRAMUSCULAR; INTRAVENOUS at 16:30:00

## 2024-12-11 RX ADMIN — SODIUM CHLORIDE, SODIUM LACTATE, POTASSIUM CHLORIDE, CALCIUM CHLORIDE: 600; 310; 30; 20 INJECTION, SOLUTION INTRAVENOUS at 14:00:00

## 2024-12-11 RX ADMIN — ONDANSETRON 4 MG: 2 INJECTION INTRAMUSCULAR; INTRAVENOUS at 16:30:00

## 2024-12-11 RX ADMIN — ROCURONIUM BROMIDE 50 MG: 10 INJECTION, SOLUTION INTRAVENOUS at 14:04:00

## 2024-12-11 RX ADMIN — EPHEDRINE SULFATE 10 MG: 50 INJECTION INTRAVENOUS at 14:56:00

## 2024-12-11 RX ADMIN — ROCURONIUM BROMIDE 20 MG: 10 INJECTION, SOLUTION INTRAVENOUS at 15:01:00

## 2024-12-11 RX ADMIN — NEOSTIGMINE METHYLSULFATE 5 MG: 1 INJECTION INTRAVENOUS at 16:34:00

## 2024-12-11 RX ADMIN — ROCURONIUM BROMIDE 10 MG: 10 INJECTION, SOLUTION INTRAVENOUS at 16:16:00

## 2024-12-11 RX ADMIN — GLYCOPYRROLATE 0.8 MG: 0.2 INJECTION, SOLUTION INTRAMUSCULAR; INTRAVENOUS at 16:34:00

## 2024-12-11 RX ADMIN — DEXAMETHASONE SODIUM PHOSPHATE 8 MG: 4 MG/ML VIAL (ML) INJECTION at 14:09:00

## 2024-12-11 NOTE — DISCHARGE INSTRUCTIONS
Post-Surgical Discharge Instructions    Quintin Pagan MD      Diet:  Continue on a soft diet until 6 weeks after the date of your surgery.  Soft diet means you can eat whatever you want, with moderation, except for the following items:    Celery                        Coconut Corn                           Dried Fruit  Green peppers            Lettuce  Mushrooms                Nuts  Olives                         Peas  Pickles                        Pineapple  Popcorn                      Spinach Raw vegetables  Seeds                          Skins of fruits and vegetables    Activity:  No heavy lifting greater than 10 lbs and no exercising for a total of 6 weeks from the date of surgery.  You may walk and climb stairs with moderation. If your abdomen is more uncomfortable than the day before, you need to be less active as you may be pulling on your stitches.    Bathing:  You may shower as often as you would like, but no submerging the incisions under water for a total of 2 weeks from the date of surgery.  This includes no swimming, no baths, and no hot-tubs.      Wound:  Keep the wound dry.  No dressing is necessary unless there is drainage coming from the wound.  If the drainage is excessive or looks like pus, please call our office.    Driving: You may drive provided that you are no longer taking your narcotic pain medication.      Bowel Function:  After surgery, your bowel movements will be irregular.  It is normal to have up to 3 bowel movements a day or as low as 1 bowel movement every 3 days.  Occasionally, your movements may be even more irregular than this.  As long as you are not vomiting or have a fever over 100.3, you don’t need to be overly concerned.      Return to Work:  Most patients return to work after 1-2 weeks from the date of their surgery.  You will still have a lifting restriction of no greater than 10 lbs from the date of your surgery until 6 weeks.  If your work requires heavy lifting,  you will need to stay off work for 6 weeks.  When you are ready to return to work, please call the office and we will send a work release to your employer.      Appointment: Please call our office for an appointment in 10-14 days after surgery, unless otherwise instructed.  This will allow ample time for the swelling and soreness to resolve before your wound is examined. If you have fevers, chills, or if you are concerned about your wound, please call us immediately at [phone].      Thank you for entrusting us with your care.         You received a drug called Toradol which is an Anti Inflammatory at:  4:30pm.  If you are allowed to take Anti inflammatories:    Do not take any Anti Inflammatory like Motrin, Aleve or Ibuprophen until after: 10:30pm.  Please report any suspected allergic reactions or bleeding issues to your doctor

## 2024-12-11 NOTE — H&P
Follow Up Visit Note       Active Problems      No diagnosis found.        Chief Complaint   No chief complaint on file.      History of Present Illness  This is a very nice 64-year-old gentleman who I met on 7/22/2024 when he presented to me with umbilical pain and concern for a possible recurrent umbilical hernia. Patient underwent an umbilical hernia repair around 20 years ago. He believes a mesh was placed at that time.  Patient returns for follow-up today after obtaining a CT scan of the abdomen and pelvis.    CT scan of the abdomen and pelvis performed on 8/6/2024 revealed a tiny fat-containing supraumbilical ventral abdominal wall hernia just above the umbilicus.  There was also an incidental finding of a small, fat-containing right inguinal hernia.  Patient denies any bulge or pain in the right groin area.  He continues to complain of discomfort and mild pain with activity beneath his umbilicus.  No obstructive symptoms.      Allergies  Allan is allergic to amiodarone and bee venom.    Past Medical / Surgical / Social / Family History    The past medical and past surgical history have been reviewed by me today.    Past Medical History:    Arrhythmia    resolved AF    Atherosclerosis of coronary artery    Back problem    BPH (benign prostatic hyperplasia)    Coronary atherosclerosis    Dizziness    Hearing impairment    High cholesterol    History of angioplasty    Other and unspecified hyperlipidemia    Pneumonia due to organism    Visual impairment     Past Surgical History:   Procedure Laterality Date    Angioplasty (coronary)      Cath drug eluting stent      Hernia surgery      Ndsc ablation & rcnstj atria lmtd w/o bypass  04/2021    Other      reattachment tendon elbow bilaterl    Removal of tonsils,13+ y/o      Tonsillectomy         The family history and social history have been reviewed by me today.    Family History   Problem Relation Age of Onset    Heart Disease Father         Lung Cancer     Cancer Father     Heart Disease Mother         Emphysema    Hypertension Brother     Cancer Brother     Heart Disease Brother         Rare Blood Cancer     Social History     Socioeconomic History    Marital status:    Occupational History    Occupation:      Employer: [employer]   Tobacco Use    Smoking status: Former     Current packs/day: 0.00     Average packs/day: 1 pack/day for 25.0 years (25.0 ttl pk-yrs)     Types: Cigarettes     Start date:      Quit date: 2004     Years since quittin.6    Smokeless tobacco: Never    Tobacco comments:     quit 6 years ago   Vaping Use    Vaping status: Never Used   Substance and Sexual Activity    Alcohol use: Yes     Alcohol/week: 2.0 standard drinks of alcohol     Types: 2 Glasses of wine per week     Comment: 1-2 drinks on weekends    Drug use: Not Currently     Types: Cannabis     Comment: gummies a few months ago   Other Topics Concern    Caffeine Concern No    Exercise No      No current outpatient medications on file.     Review of Systems  A 10 point review of systems was performed and negative unless otherwise documented per HPI.     Physical Findings   Ht 71\"   Wt 195 lb (88.5 kg)   BMI 27.20 kg/m²   Physical Exam  Vitals and nursing note reviewed. Exam conducted with a chaperone present.   Constitutional:       General: He is not in acute distress.  HENT:      Head: Normocephalic and atraumatic.      Mouth/Throat:      Mouth: Mucous membranes are moist.   Cardiovascular:      Rate and Rhythm: Normal rate and regular rhythm.   Pulmonary:      Effort: Pulmonary effort is normal.   Abdominal:      General: There is no distension.      Palpations: Abdomen is soft.      Tenderness: There is no abdominal tenderness.      Hernia: A hernia (There is suggestion of a small, reducible umbilical hernia) is present.   Musculoskeletal:         General: No deformity.   Skin:     General: Skin is warm and dry.    Neurological:      General: No focal deficit present.      Mental Status: He is alert.   Psychiatric:         Mood and Affect: Mood normal.     I have personally reviewed the patient's CT scan of the abdomen and pelvis from 8/6/2024.  I showed the imaging to the patient at bedside.     Assessment   No diagnosis found.    This is a very nice 64-year-old gentleman who I met on 7/22/2024 when he presented to me with umbilical pain and concern for a possible recurrent umbilical hernia. Patient underwent an umbilical hernia repair around 20 years ago. He believes a mesh was placed at that time.  Patient returns for follow-up today after obtaining a CT scan of the abdomen and pelvis.    CT scan of the abdomen and pelvis performed on 8/6/2024 revealed a tiny fat-containing supraumbilical ventral abdominal wall hernia just above the umbilicus.  There was also an incidental finding of a small, fat-containing right inguinal hernia.  Patient denies any bulge or pain in the right groin area.  He continues to complain of discomfort and mild pain with activity beneath his umbilicus.  No obstructive symptoms.    Plan   Given patient's ongoing umbilical pain and CT findings suggestive of a small, recurrent, fat-containing umbilical hernia, I suggest planning to go to the operating room for robotic umbilical hernia repair with possible mesh, possible open.  The details of the surgery were discussed including the expected recovery time, risks, benefits and alternatives.  Patient expressed understanding and was agreeable to schedule surgery.  Surgery has been scheduled for 12/11/2024 pending cardiac clearance.    In the meantime, I advised patient to go to the emergency room with worsening pain, obstructive symptoms or any other new severe symptoms.  Patient expressed understanding and was agreeable to plan.     No orders of the defined types were placed in this encounter.      Imaging & Referrals   VITAL SIGNS  VITAL SIGNS - NOTIFY  PHYSICIAN  NURSING COMMUNICATION  PLACE PIV  ACTIVITY AS TOLERATED  HEIGHT AND WEIGHT  INITIATE ADULT PREOP PROPHYLACTIC ABX PROTOCOL  VERIFY INFORMED CONSENT  NPO    Follow Up  No follow-ups on file.    Quintin Pagan MD

## 2024-12-11 NOTE — ANESTHESIA POSTPROCEDURE EVALUATION
[facility]    Allan Peoples Patient Status:  Hospital Outpatient Surgery   Age/Gender 64 year old male MRN [id number]   Location [facility] SURGERY Attending Quintin Pagan MD   Hosp Day # 0 PCP Nghia Garcia MD       Anesthesia Post-op Note    ROBOTIC UMBILICAL HERNIORRHAPHY WITH MESH    Procedure Summary       Date: 12/11/24 Room / Location:  MAIN OR 08 / [facility] MAIN OR    Anesthesia Start: 1400 Anesthesia Stop: 1649    Procedure: ROBOTIC UMBILICAL HERNIORRHAPHY WITH MESH (Abdomen) Diagnosis:       Umbilical hernia without obstruction and without gangrene      (Umbilical hernia without obstruction and without gangrene [K42.9])    Surgeons: Quintin Pagan MD Anesthesiologist: Allan Rodriguez MD    Anesthesia Type: general ASA Status: 3            Anesthesia Type: general    Vitals Value Taken Time   /82 12/11/24 1650   Temp 98.9 12/11/24 1650   Pulse 100 12/11/24 1650   Resp 14 12/11/24 1650   SpO2 100 12/11/24 1650       Patient Location: PACU    Anesthesia Type: general    Airway Patency: patent and extubated    Postop Pain Control: adequate    Mental Status: preanesthetic baseline    Nausea/Vomiting: none    Cardiopulmonary/Hydration status: stable euvolemic    Complications: no apparent anesthesia related complications    Postop vital signs: stable    Dental Exam: Unchanged from Preop    Patient to be discharged from PACU when criteria met.

## 2024-12-11 NOTE — ANESTHESIA PREPROCEDURE EVALUATION
PRE-OP EVALUATION    Patient Name: Allan Peoples    Admit Diagnosis: Umbilical hernia without obstruction and without gangrene [K42.9]    Pre-op Diagnosis: Umbilical hernia without obstruction and without gangrene [K42.9]    ROBOTIC UMBILICAL HERNIORRHAPHY    Anesthesia Procedure: ROBOTIC UMBILICAL HERNIORRHAPHY    Surgeons and Role:     * Quintin Pagan MD - Primary    Pre-op vitals reviewed.        Body mass index is 27.2 kg/m².    Current medications reviewed.  Hospital Medications:  No current facility-administered medications on file as of .       Outpatient Medications:   Prescriptions Prior to Admission[1]    Allergies: Amiodarone and Bee venom      Anesthesia Evaluation    Patient summary reviewed.    Anesthetic Complications           GI/Hepatic/Renal             (+) chronic renal disease                    Cardiovascular                       (+) CAD             (+) dysrhythmias and atrial fibrillation                  Endo/Other                                  Pulmonary                           Neuro/Psych                              FREDA (acute kidney injury) (HCC) Acute blood loss anemia  Acute kidney injury (HCC) Allergic drug reaction  Atrial fibrillation with RVR (HCC) Atrial fibrillation with controlled ventricular response (HCC)  Chest pain of uncertain etiology Coronary artery disease involving native coronary artery of native heart without angina pectoris  Dizzy spells Fall  Multiple closed fractures of ribs of left side Orthostatic hypotension  PAF (paroxysmal atrial fibrillation) (HCC) Perinephric hematoma  Retroperitoneal hematoma Rigors  Syncope and collapse             Past Surgical History:   Procedure Laterality Date    Angioplasty (coronary)      Cath drug eluting stent      Hernia surgery      Ndsc ablation & rcnstj atria lmtd w/o bypass  04/2021    Other      reattachment tendon elbow bilaterl    Removal of tonsils,13+ y/o      Tonsillectomy       Social History      Socioeconomic History    Marital status:    Occupational History    Occupation:      Employer: [employer]   Tobacco Use    Smoking status: Former     Current packs/day: 0.00     Average packs/day: 1 pack/day for 25.0 years (25.0 ttl pk-yrs)     Types: Cigarettes     Start date:      Quit date: 2004     Years since quittin.6    Smokeless tobacco: Never    Tobacco comments:     quit 6 years ago   Vaping Use    Vaping status: Never Used   Substance and Sexual Activity    Alcohol use: Yes     Alcohol/week: 2.0 standard drinks of alcohol     Types: 2 Glasses of wine per week     Comment: 1-2 drinks on weekends    Drug use: Not Currently     Types: Cannabis     Comment: gummies a few months ago   Other Topics Concern    Caffeine Concern No    Exercise No     History   Drug Use Unknown     Comment: gummies a few months ago     Available pre-op labs reviewed.               Airway      Mallampati: I  Mouth opening: >3 FB  TM distance: > 6 cm  Neck ROM: full Cardiovascular    Cardiovascular exam normal.         Dental    Dentition appears grossly intact         Pulmonary    Pulmonary exam normal.                 Other findings              ASA: 3   Plan: general                             Present on Admission:  **None**             [1]   No medications prior to admission.

## 2024-12-11 NOTE — ANESTHESIA PROCEDURE NOTES
Airway  Date/Time: 12/11/2024 2:05 PM  Urgency: elective    Airway not difficult    General Information and Staff    Patient location during procedure: OR  Anesthesiologist: Allan Rodriguez MD  Resident/CRNA: Paxton Delaney CRNA  Performed: CRNA   Performed by: Paxton Delaney CRNA  Authorized by: Allan Rodriguez MD      Indications and Patient Condition  Indications for airway management: anesthesia  Spontaneous Ventilation: absent  Sedation level: deep  Preoxygenated: yes  Patient position: sniffing  MILS maintained throughout  Mask difficulty assessment: 2 - vent by mask + OA or adjuvant +/- NMBA  Planned trial extubation    Final Airway Details  Final airway type: endotracheal airway      Successful airway: ETT  Cuffed: yes   Successful intubation technique: direct laryngoscopy  Facilitating devices/methods: intubating stylet  Endotracheal tube insertion site: oral  Blade: Lucretia  Blade size: #3  ETT size (mm): 7.5    Cormack-Lehane Classification: grade I - full view of glottis  Placement verified by: capnometry   Cuff volume (mL): 6  Measured from: lips  ETT to lips (cm): 22  Number of attempts at approach: 1  Number of other approaches attempted: 0    Additional Comments  Dentition per pre op

## 2024-12-11 NOTE — OPERATIVE REPORT
[facility]  Operative Note    Allan Peoples Location: OR   CSN [id number] MRN [id number]    1960 Age 64 year old   Admission Date 2024 Operation Date 2024   Attending Physician Quintin Pagan MD Operating Physician Quintin Pagan MD   PCP Nghia Garcia MD          Patient Name: Allan Peoples    Preoperative Diagnosis: Umbilical hernia without obstruction and without gangrene, recurrent [K42.9]    Postoperative Diagnosis: Same as preoperative diagnosis    Primary Surgeon: Quintin Pagan MD    Assistant: Shanika Armijo PA-C    Anesthesia: General    Procedures: Robotic repair of recurrent umbilical hernia with mesh    Implants: 13 x 9 cm synthetic Synecor preperitoneal hernia mesh    Specimen: None    Drains: None    Estimated Blood Loss: 5 cc    Complications: None immediate    Condition: Stable    Indications for Surgery:   This is a very nice 64-year-old gentleman who I met on 2024 when he presented to me with umbilical pain and concern for a possible recurrent umbilical hernia. Patient underwent an umbilical hernia repair around 20 years ago. He believes a mesh was placed at that time.  Patient returned for follow-up after obtaining a CT scan of the abdomen and pelvis.     CT scan of the abdomen and pelvis performed on 2024 revealed a tiny fat-containing supraumbilical ventral abdominal wall hernia just above the umbilicus.  There was also an incidental finding of a small, fat-containing right inguinal hernia.  Patient denies any bulge or pain in the right groin area.  He continues to complain of discomfort and mild pain with activity beneath his umbilicus.  No obstructive symptoms.     Given patient's ongoing umbilical pain and CT findings suggestive of a small, recurrent, fat-containing umbilical hernia, I suggest planning to go to the operating room for robotic umbilical hernia repair with possible mesh, possible open.  The details of the surgery were discussed  including the expected recovery time, risks, benefits and alternatives.  Patient expressed understanding and was agreeable to schedule surgery.    Patient presents for elective surgery today.  He received preoperative cardiac clearance.  Consent was signed.  All questions answered.    Surgical Findings:   Swiss cheese type of recurrent umbilical hernia with multiple small fascial defects between previously placed sutures.  The hernia defects contained preperitoneal fat.  The hernia defects in aggregate measured 3.5 x 1 cm.    Description of Procedure:   Patient was brought to the operating room and laid supine on the OR table. Bilateral sequential compression devices were placed. Preoperative antibiotics were given. Patient was induced under general endotracheal anesthesia. Both arms were tucked with all pressure points well-padded.  A Goff catheter was inserted using sterile technique.  The abdomen was prepped and draped in the usual sterile fashion. A timeout was performed.     I began by establishing pneumoperitoneum using a Veress needle through a stab incision at Mcgee's point.  There was appropriately low opening pressure.  An 8 mm robotic trocar was inserted in the left upper lateral abdomen just beneath the costal margin.  The Veress needle was seen free-floating in the peritoneal cavity and was removed.  There was no evidence of underlying visceral injury. Two additional 8 mm robotic trocars were placed under direct vision in the left lateral abdomen spaced about 10 cm apart, one in line with the umbilicus and one in the left lower quadrant.  The bed was flexed. The da Gregg Xi robotic platform was brought into the field and docked.     Examination of the abdomen revealed hernia defects beneath the umbilicus containing preperitoneal fat.  I began by scoring an approximately 15 cm long line in the peritoneum at the left rectus abdominis muscle. The peritoneum and preperitoneal fat was pulled down and   from the overlying posterior fascia using a combination of blunt and sharp dissection with cauterized scissors.  This dissection was continued circumferentially around the hernia defects.  The hernia sac with preperitoneal fat was pulled out of the hernia defects. The dissection continued towards the right until we reached the right rectus abdominis muscle. Hemostasis was achieved throughout the dissection using electrocautery. The peritoneum was rather thin and did tear in several spots.     There were 3 separate fascial defects beneath the umbilicus.  The defects measured 3.5 x 1 cm in aggregate. The fascial defects were closed primarily using running nonabsorbable O VLoc sutures x3.     A 15 x 10 cm oval piece of synthetic Synecor preperitoneal ventral hernia mesh was cut down to 13 x 9 cm and introduced into the field.  I used the tail of the fascial closure sutures to hold the center of the mesh up over the hernia defect. The periphery of the mesh were secured to the abdominal wall using interrupted 2-0 Vicryl sutures. Additional 2-0 Vicryl sutures were used to tack the central portion of the mesh to the abdominal wall. All needle and suture material were successfully retrieved. The mesh was lying very nicely flat against the abdominal wall.      I sutured the peritoneal flap back to its left-sided cut edge using a running, absorbable 2-0 VLoc suture.  There were several small defects in the peritoneal flap which were repaired using 2-0 Vicryl and 3-0 Vicryl sutures.  The mesh was completely covered with peritoneum at the conclusion of this closure.       The da Gregg Xi robotic platform was undocked.  The patient was leveled.  Each trocar was removed under direct vision. Each incision appeared hemostatic. Pneumoperitoneum was evacuated.  Each skin incision was anesthetized using a total of 30 cc of 0.25% Marcaine.  Each skin incision was closed using interrupted 4-0 Monocryl suture in a subcuticular  fashion. The skin was cleaned and dried and skin glue was placed over each incision as a final dressing.        The Goff catheter was removed.  The patient was awakened from anesthesia, extubated and transported to the postanesthesia care unit in stable condition.  All sponge, needle and instrument counts were correct at the end of the case.  I was present for the entire case.      Quintin Pagan MD  12/11/2024  4:39 PM

## 2024-12-24 ENCOUNTER — OFFICE VISIT (OUTPATIENT)
Facility: LOCATION | Age: 64
End: 2024-12-24
Payer: COMMERCIAL

## 2024-12-24 VITALS
HEIGHT: 71 IN | SYSTOLIC BLOOD PRESSURE: 155 MMHG | BODY MASS INDEX: 27.44 KG/M2 | WEIGHT: 196 LBS | OXYGEN SATURATION: 97 % | TEMPERATURE: 97 F | HEART RATE: 56 BPM | DIASTOLIC BLOOD PRESSURE: 85 MMHG

## 2024-12-24 DIAGNOSIS — K42.9 UMBILICAL HERNIA WITHOUT OBSTRUCTION AND WITHOUT GANGRENE: ICD-10-CM

## 2024-12-24 DIAGNOSIS — Z98.890 POSTOPERATIVE STATE: Primary | ICD-10-CM

## 2024-12-24 PROCEDURE — 3077F SYST BP >= 140 MM HG: CPT

## 2024-12-24 PROCEDURE — 3079F DIAST BP 80-89 MM HG: CPT

## 2024-12-24 PROCEDURE — 99212 OFFICE O/P EST SF 10 MIN: CPT

## 2024-12-24 PROCEDURE — 3008F BODY MASS INDEX DOCD: CPT

## 2024-12-24 NOTE — PROGRESS NOTES
Follow Up Visit Note       Active Problems      1. Postoperative state    2. Umbilical hernia without obstruction and without gangrene          Chief Complaint   Chief Complaint   Patient presents with    Post-Op     PO 12/11/24 JJS, ROBOTIC UMBILICAL HERNIORRHAPHY WITH MESH, no symptoms.           History of Present Illness  Allan is a 64 year old male who underwent robotic umbilical herniorrhaphy with mesh with Dr. Pagan on 12/11/2024. He presents to clinic today for follow up evaluation.    He reports he is overall doing well following surgery. He reports abdominal soreness for the first week and states he took Norco for the first 3-4 days. He denies nausea or vomiting. He reports his discharge instructions state he should follow a soft diet for 6 weeks. He reports constipation following surgery that has since resolved. He denies urinary symptoms. He denies fever or chills.       Allergies  Allan is allergic to amiodarone and bee venom.    Past Medical / Surgical / Social / Family History    The past medical and past surgical history have been reviewed by me today.    Past Medical History:    Arrhythmia    resolved AF    Atherosclerosis of coronary artery    Back problem    BPH (benign prostatic hyperplasia)    Coronary atherosclerosis    Dizziness    Hearing impairment    High cholesterol    History of angioplasty    Other and unspecified hyperlipidemia    Pneumonia due to organism    Visual impairment     Past Surgical History:   Procedure Laterality Date    Angioplasty (coronary)      Cath drug eluting stent      Hernia surgery      Ndsc ablation & rcnstj atria lmtd w/o bypass  04/2021    Other      reattachment tendon elbow bilaterl    Removal of tonsils,13+ y/o      Tonsillectomy         The family history and social history have been reviewed by me today.    Family History   Problem Relation Age of Onset    Heart Disease Father         Lung Cancer    Cancer Father     Heart Disease Mother          Emphysema    Hypertension Brother     Cancer Brother     Heart Disease Brother         Rare Blood Cancer     Social History     Socioeconomic History    Marital status:    Occupational History    Occupation:      Employer: [employer]   Tobacco Use    Smoking status: Former     Current packs/day: 0.00     Average packs/day: 1 pack/day for 25.0 years (25.0 ttl pk-yrs)     Types: Cigarettes     Start date:      Quit date: 2004     Years since quittin.6    Smokeless tobacco: Never    Tobacco comments:     quit 6 years ago   Vaping Use    Vaping status: Never Used   Substance and Sexual Activity    Alcohol use: Yes     Alcohol/week: 2.0 standard drinks of alcohol     Types: 2 Glasses of wine per week     Comment: 1-2 drinks on weekends    Drug use: Not Currently     Types: Cannabis     Comment: gummies a few months ago   Other Topics Concern    Caffeine Concern No    Exercise No        Current Outpatient Medications:     Aspirin 81 MG Oral Cap, Take 1 capsule by mouth daily., Disp: , Rfl:     Naloxone HCl 4 MG/0.1ML Nasal Liquid, 4 mg by Intranasal route as needed (May repeat as needed in other nostril if symptoms persist). If patient remains unresponsive, repeat dose in other nostril 2-5 minutes after first dose., Disp: 1 kit, Rfl: 0    Misc Natural Products (NEURIVA OR), Take 1 capsule by mouth daily., Disp: , Rfl:     atorvastatin 80 MG Oral Tab, Take 1 tablet (80 mg total) by mouth nightly., Disp: , Rfl:     zolpidem 10 MG Oral Tab, Take 1 tablet (10 mg total) by mouth nightly as needed for Sleep., Disp: , Rfl:     TURMERIC OR, Take 500 mg by mouth daily., Disp: , Rfl:     HYDROcodone-acetaminophen (NORCO) 5-325 MG Oral Tab, Take 1 tablet by mouth every 6 (six) hours as needed for Pain. (Patient not taking: Reported on 2024), Disp: 15 tablet, Rfl: 0     Review of Systems  The Review of Systems has been reviewed by me during today.  Review of Systems    Constitutional:  Negative for appetite change, chills, fatigue and fever.   Gastrointestinal:  Positive for constipation. Negative for abdominal distention, abdominal pain, diarrhea, nausea and vomiting.   Genitourinary:  Negative for difficulty urinating, dysuria and hematuria.   Skin:  Negative for rash and wound.        Physical Findings   /85 (BP Location: Left arm, Patient Position: Sitting, Cuff Size: adult)   Pulse 56   Temp 97.3 °F (36.3 °C) (Temporal)   Ht 71\"   Wt 196 lb (88.9 kg)   SpO2 97%   BMI 27.34 kg/m²   Physical Exam  Vitals reviewed.   Constitutional:       General: He is not in acute distress.     Appearance: Normal appearance. He is not ill-appearing.   HENT:      Head: Normocephalic and atraumatic.   Eyes:      General: No scleral icterus.     Conjunctiva/sclera: Conjunctivae normal.   Pulmonary:      Effort: Pulmonary effort is normal. No respiratory distress.   Abdominal:      General: There is no distension.      Palpations: Abdomen is soft.      Tenderness: There is no abdominal tenderness. There is no guarding or rebound.      Comments: Abdomen soft, non-distended, non-tender to palpation. Laparoscopic incision x 4 are clean, dry and healing appropriately. No surrounding erythema or drainage. No fluctuance to palpation. No signs of infection. Dermabond  in place.      Skin:     General: Skin is warm and dry.      Coloration: Skin is not jaundiced.      Findings: No bruising, erythema or rash.   Neurological:      Mental Status: He is alert.   Psychiatric:         Mood and Affect: Mood normal.         Behavior: Behavior normal.          Assessment   1. Postoperative state    2. Umbilical hernia without obstruction and without gangrene        Plan   The patient is doing well postoperatively.   I discussed with the patient that he has no dietary restrictions following umbilical hernia surgery. He may continue diet as tolerated   Tylenol and Ibuprofen as needed for pain control.    Continue local wound care, soap and water to the incisions.   Recommend Lifting restrictions of no greater than 15-20 lbs for 6 weeks postoperatively  All the patient's questions and concerns were addressed. They voiced understanding and are in agreement with the plan     Follow Up  They may follow up with [facility] general surgery on an as-needed basis.      Jessica Adame PA-C

## 2025-01-20 NOTE — PAT NURSING NOTE
PreOp Instructions     You are scheduled for: a Cardiac Procedure     Date of Procedure: 01/24/25     Diet: No restrictions, you are able to eat and drink before your procedure.     Medications: No restrictions, you can take your medications regularly.     Skin Prep : Shower with antibacterial soap using a clean washcloth, prior to procedure. Once dried off, no lotions/powders/creams/ointments, etc.     Arrival Time: The day prior to your procedure you will receive a phone call between 3:00 pm - 6:00 pm with your arrival time. If you haven't received a phone call, please check your voicemail messages., If you did not receive a voice mail and it is after 6:00 pm, please call the nursing supervisor at [phone].    Driving After Procedure: No Sedation- You may drive self home     Discharge Teaching: Your nurse will give you specific instructions before discharge, Any questions, please call the physician's office

## 2025-01-24 ENCOUNTER — HOSPITAL ENCOUNTER (OUTPATIENT)
Dept: INTERVENTIONAL RADIOLOGY/VASCULAR | Facility: HOSPITAL | Age: 65
Discharge: HOME | End: 2025-01-24
Attending: INTERNAL MEDICINE | Admitting: INTERNAL MEDICINE
Payer: COMMERCIAL

## 2025-01-24 VITALS
OXYGEN SATURATION: 100 % | BODY MASS INDEX: 27.44 KG/M2 | DIASTOLIC BLOOD PRESSURE: 97 MMHG | RESPIRATION RATE: 10 BRPM | HEIGHT: 71 IN | HEART RATE: 62 BPM | WEIGHT: 196 LBS | TEMPERATURE: 99 F | SYSTOLIC BLOOD PRESSURE: 141 MMHG

## 2025-01-24 DIAGNOSIS — Z45.09 ENCOUNTER FOR LOOP RECORDER AT END OF BATTERY LIFE: ICD-10-CM

## 2025-01-24 PROCEDURE — 0JPT02Z REMOVAL OF MONITORING DEVICE FROM TRUNK SUBCUTANEOUS TISSUE AND FASCIA, OPEN APPROACH: ICD-10-PCS | Performed by: INTERNAL MEDICINE

## 2025-01-24 PROCEDURE — 33286 RMVL SUBQ CAR RHYTHM MNTR: CPT | Performed by: INTERNAL MEDICINE

## 2025-01-24 RX ORDER — LIDOCAINE HYDROCHLORIDE AND EPINEPHRINE BITARTRATE 20; .01 MG/ML; MG/ML
INJECTION, SOLUTION SUBCUTANEOUS
Status: COMPLETED
Start: 2025-01-24 | End: 2025-01-24

## 2025-01-24 NOTE — DISCHARGE INSTRUCTIONS
LINQ INSERTION DISCHARGE INSTRUCTIONS:    You will go home with a dressing over the incision site. This will need to remain in place for 4 days. You may sponge bathe until the 4th day.     On day 4, wash your hands with soap and water for 20 seconds prior to removing the dressing. On day 4, you may shower and remove dressing. Leave the steri strips in place. They will fall of on their own in 7-10 days.     Signs of infection... Redness at the puncture site, increased pain, swelling, fever or drainage. If you have any of these symptoms, call your doctor's appointment for an appointment.     You may have slight bruising. Take Tylenol for discomfort if you are not allergic.     If you have any questions, problems, or concerns, please contact your doctor's office.

## 2025-01-24 NOTE — H&P
64 year old male    61 year old male with PMhx as below, previously diagnosed with ? szr disorder 2014 admitted with syncope    USOH, palps in last year he noticed in paroxysmal fashion, unclear if he had last night. Went to bathroom and had syncope with urniation. Broke some ribs.    With getting CxR in ED had another episode, unfotunately wasn't on tele.     Found to have AF when hooked up to tele. Converted yesterday without pause. Has episodes of noticing his HR being elevated multiple times a month. Wasn't sure what it was.    As of 5/21 --> recurrent admission after linq placed for syncope --> no event on linq. amio caused rigors and elevated BP so changed to sotalol with no subjective recurrence of arrhthmias since on his part. No more syncope.    As of 10/21 -->marked increase in AF burden in last couple of months. Admitted last month and sotalol increased to 120 bid. Still having some but better. NSR today. Renal hematoma improved    His atrial fibrillation ablation procedure was canceled by Dr Ladd due to personal reasons  Here to meet me to proceed with ablation    As of 1/18/2022,  Had ablation of atrial fibrillation 11/1/2021  On sotalol 60mg bid QTc 435  Per his report, some atrial fibrillation but may be alcohol triggered usually. Lasts about an hour at most but used to be 10 hours. However, no episodse have been seen on LINQ since ablation    As of 7/5/2022,  On sotalol 60mg bid  Also taking asa 81mg daily  He also stopped eliquis at some point    As of 1/10/2023,  Sotalol stopped last visit  Doing well    As of 1/12/2024,  This is a telephone visit.  Feels well  His monitor is blinking    As of 12/17/2024,  This is a telephone visit.  Recovering from umbilical hernia repair  ILR at [facility]    As of 1/24/2025   Here for loop removal    Denies chest pain, shortness of breath, palpitations, lightheadedness, syncope, orthopnea, paroxysmal nocturnal dyspnea, or edema.    Medications:  Current  Outpatient Medications:   zolpidem 10 MG Oral Tab, Take 1 tablet (10 mg total) by mouth daily., Disp: 30 tablet, Rfl: 0  atorvastatin 80 MG Oral Tab, Take 1 tablet (80 mg total) by mouth nightly., Disp: 90 tablet, Rfl: 3  TURMERIC OR, Take 250 mg by mouth., Disp: , Rfl:   aspirin 81 MG Oral Tab EC, Take 81 mg by mouth daily., Disp: , Rfl:     Physical:  There were no vitals taken for this visit.    Data:  EC/10/2023: SR 68  EC2022: SR 47 QTc 420  LINQ implatned 2021: no atrial fibrillation   Nuc stress: 2021: normal perfusion and EF  Echo: 2023: EF normal, normal LA size    Impression:  paroxysmal atrial fibrillation s/p ablation 2021  S/p LINQ 2021  coronary artery disease s/p PCI 2021  Orthostatic hypotension    Plan:  CHADS-Vasc=1. asa  No atrial fibrillation seen on LINQ, prior was 14%  For loop recorder, plan removal. The risks (including, but not limited to, hematoma, infection), benefits (removal of hardware), and alternatives (leave in) of the procedure were discussed. The patient understands and agrees to proceed.Phone Treatment Consent

## 2025-02-11 NOTE — PROCEDURES
Loop Recorder Removal      History:  64 year old male with a h/o syncope s/p loop now at [facility] who presents for its removal. The risks and benefits of the procedure (including, but not limited to, hematoma and infection) were discussed. The patient understands and agrees to proceed.    Procedure:  The patient was admitted to the invasive lab holding unit. The left chest was prepped and draped in the usual sterile fashion. 1% lidocaine with epinephrine was used for skin anesthesia. A stab incision was made over the old device and it was removed. The wound was dressed.    Device data:           Model Number Serial Number   Loop Recorder (2021) [employer] [id number]       Conclusions:  Successful loop recorder removal      Mulugeta Short MD  Clinical Cardiac Electrophysiology  [facility]

## 2025-06-08 ENCOUNTER — HOSPITAL ENCOUNTER (OUTPATIENT)
Age: 65
Discharge: HOME | End: 2025-06-08
Attending: NURSE PRACTITIONER
Payer: COMMERCIAL

## 2025-06-08 VITALS
DIASTOLIC BLOOD PRESSURE: 76 MMHG | OXYGEN SATURATION: 98 % | RESPIRATION RATE: 16 BRPM | SYSTOLIC BLOOD PRESSURE: 130 MMHG | HEART RATE: 57 BPM | TEMPERATURE: 97 F

## 2025-06-08 DIAGNOSIS — R43.2 DYSGEUSIA: ICD-10-CM

## 2025-06-08 DIAGNOSIS — B34.9 VIRAL ILLNESS: Primary | ICD-10-CM

## 2025-06-08 PROCEDURE — 99213 OFFICE O/P EST LOW 20 MIN: CPT | Performed by: NURSE PRACTITIONER

## 2025-06-08 NOTE — ED PROVIDER NOTES
History     Chief Complaint   Patient presents with    Ear Problem Pain       Subjective:   HPI    Allan Peoples, 65 year old male with notable medical history of hearing aid use, a-fib who presents with Right ear discomfort. Patient reports discomfort started approx 1-week with new lymph node swelling yesterday, which has improved today. He also reports a metallic taste in his mouth. Denies fever, chills, cough, SOB, appetite changes. +mildly more tired than normal.      Problem List[1]   Objective:   Past Medical History:    Arrhythmia    resolved AF    Atherosclerosis of coronary artery    Back problem    BPH (benign prostatic hyperplasia)    Coronary atherosclerosis    Dizziness    Hearing impairment    High cholesterol    History of angioplasty    Other and unspecified hyperlipidemia    Pneumonia due to organism    Visual impairment              Past Surgical History:   Procedure Laterality Date    Angioplasty (coronary)      Cath drug eluting stent      Hernia surgery      Ndsc ablation & rcnstj atria lmtd w/o bypass  2021    Other      reattachment tendon elbow bilaterl    Removal of tonsils,13+ y/o      Tonsillectomy                  Social History     Socioeconomic History    Marital status:    Occupational History    Occupation:      Employer: [employer]   Tobacco Use    Smoking status: Former     Current packs/day: 0.00     Average packs/day: 1 pack/day for 25.0 years (25.0 ttl pk-yrs)     Types: Cigarettes     Start date:      Quit date: 2004     Years since quittin.1    Smokeless tobacco: Never    Tobacco comments:     quit 6 years ago   Vaping Use    Vaping status: Never Used   Substance and Sexual Activity    Alcohol use: Yes     Alcohol/week: 2.0 standard drinks of alcohol     Types: 2 Glasses of wine per week     Comment: 1-2 drinks on weekends    Drug use: Not Currently     Types: Cannabis     Comment: gummies a few months ago    Other Topics Concern    Caffeine Concern No    Exercise No              Medications Ordered Prior to Encounter[2]      Constitutional and vital signs reviewed.      All other systems reviewed and negative except as noted above.    I have reviewed the family history, social history, allergies, and outpatient medications.     History reviewed from EMR: Encounters, problem list, allergies, medications      Physical Exam     ED Triage Vitals [06/08/25 1018]   /76   Pulse 57   Resp 16   Temp 97.3 °F (36.3 °C)   Temp src Oral   SpO2 98 %   O2 Device        Current:/76   Pulse 57   Temp 97.3 °F (36.3 °C) (Oral)   Resp 16   SpO2 98%       Physical Exam  Vitals and nursing note reviewed.   Constitutional:       General: He is not in acute distress.     Appearance: Normal appearance. He is normal weight. He is not ill-appearing or toxic-appearing.   HENT:      Head: Normocephalic and atraumatic.      Right Ear: Tympanic membrane, ear canal and external ear normal. No middle ear effusion. No mastoid tenderness. Tympanic membrane is not injected, retracted or bulging.      Left Ear: Tympanic membrane, ear canal and external ear normal.  No middle ear effusion. No mastoid tenderness. Tympanic membrane is not injected, retracted or bulging.      Ears:      Comments: Benign acute ear exams. Right TM with evidence of scarring.     Nose: Nose normal. No congestion or rhinorrhea.      Mouth/Throat:      Mouth: Mucous membranes are moist.   Eyes:      Extraocular Movements: Extraocular movements intact.      Conjunctiva/sclera: Conjunctivae normal.      Pupils: Pupils are equal, round, and reactive to light.   Cardiovascular:      Rate and Rhythm: Normal rate.      Pulses: Normal pulses.   Pulmonary:      Effort: Pulmonary effort is normal. No respiratory distress.   Musculoskeletal:         General: No swelling, tenderness or signs of injury. Normal range of motion.      Cervical back: Normal range of motion.    Lymphadenopathy:      Head:      Right side of head: Submandibular, tonsillar and posterior auricular adenopathy present.   Skin:     General: Skin is warm and dry.      Capillary Refill: Capillary refill takes less than 2 seconds.   Neurological:      General: No focal deficit present.      Mental Status: He is alert and oriented to person, place, and time. Mental status is at baseline.   Psychiatric:         Mood and Affect: Mood normal.         Behavior: Behavior normal.         Thought Content: Thought content normal.         Judgment: Judgment normal.            ED Course     Labs Reviewed - No data to display  No orders to display       Vitals:    06/08/25 1018   BP: 130/76   Pulse: 57   Resp: 16   Temp: 97.3 °F (36.3 °C)   TempSrc: Oral   SpO2: 98%            [facility]        Allan Peoples, 65 year old male with medical history as noted above who presents with Right ear discomfort   - Patient in NAD, VSS   - otitis media / externa vs canal irritation vs ETD vs other   - Benign acute exam   - Suspect viral etiology with reactive lymphadenopathy   - Supportive care and infection control measures discussed   - Follow up with primary care provider as needed    - RTED/IC precautions discussed        ** See ED course below for additional information on care provided / interventions / notable events throughout patient's encounter.           ** I have independently reviewed the radiology images, clinical lab results, and ECG tracings as described above (if applicable)    ** Concerning co-morbidities possibly affecting complaint / care: hearing aid use        Medical Decision Making  Risk  OTC drugs.        Disposition and Plan     Disposition:  Discharge  6/8/2025 10:55 am    Clinical Impression:  1. Viral illness    2. Dysgeusia            Home care instructions:      Viral Illness supportive care measures to try as applicable:  General:   - There are multiple viruses that cause similar symptoms, including:  Influenza, Rhinovirus, Adenovirus, Coronaviruses (including Covid-19), RSV, parainfluenza, etc.   - Duration of symptoms typically depends on your body's ability to heal itself, which can be affected in many ways including metabolic health, diet, and genetics.    - Symptoms typically last between 7-days to 3-weeks   - Most medications do not not cure the illness, but may help to alleviate your symptoms. However, evidence is not strong.   - Antibiotics are NOT effective for viral illnesses   - Drink plenty of fluids (water, Pedialyte, etc.)   - Get plenty of rest   - Take a multivitamin and extra Vitamin D (~2000IU+) daily, year round   - Vitamin C can help reduce symptoms if you become infected, but is more effective if taken before becoming ill   - You may benefit from Zinc (~20mg) every day, or every other other day, for a week while sick (Zinc has been shown to kill respiratory viruses)   - Alternate Naproxen / Aleve (adult: 440-500mg) & Tylenol (adult: 650-1000mg) as needed for pain / body aches / fever   - Limit excess sugar intake (can worsen inflammation caused by virus)    Infection Control:   - Wash hands often, change toothbrush, & disinfect \"high-touch\" items to limit viral spread   - Do not share utensils or drinks    Sore throat:   - Salt water gargles & Lozenges (Cepacol or Ricola)    Sinus:   - Using saline spray or a couple drops into nostrils a couple times a day can help with sinus inflammation & move mucus   - Avoid having air blow on your face as this can worsen congestion   - You may benefit from placing a garlic clove in each nostril for 10-15min which should irritate sinuses, causing you to get rid of stuck mucus. Blow nose afterwards.   - You may benefit from taking a decongestant (e.g. Sudafed - pseudoephedrine [behind the pharmacy counter]) (may temporarily elevate your heart rate and blood pressure)   - You may benefit from using a humidifier and/or steam showers then blow nose   - You may  benefit from Flonase nasal spray daily (use with head tilted down and tip pointed towards outside of eye. Breath normally. You should not feel medication go down your throat)   - You may benefit from taking a daily allergy medication (e.g. Zyrtec, Xyzal, etc.)   - You may benefit from boiling water with lemon and cayenne pepper, then breathing in the steam (you can cover your head with a towel to help funnel the steam)    Cough:   - You may benefit from spoonfuls of honey (or added to warm drinks) throughout the day   - You may benefit from cough medication containing \"Dextromethorphan [DM]\" (e.g. Delsym)   - Sleeping in an upright position        Follow-up:  Nghia Garcia MD  [address] [address]  [phone]      As needed          Medications Prescribed:  Discharge Medication List as of 6/8/2025 10:55 AM            Romario Angeles, DNP, APRN, AGACNP-BC, FNP-C, CNL  Adult-Gerontology Acute Care & Family Nurse Practitioner  [facility]      The above patient (and/or guardian) was made aware that an appropriate evaluation has been performed, and that no additional testing is required at this time. In my medical judgment, there is currently no evidence of an immediate life-threatening or surgical condition, therefore discharge is indicated at this time. The patient (and/or guardian) was advised that a small risk still exists that a serious condition could develop. The patient was instructed to arrange close follow-up with their primary care provider (or the referral provider given today). The patient received written and verbal instructions regarding their condition / concerns, demonstrated understanding, and is agreement with the outpatient treatment plan.              [1]   Patient Active Problem List  Diagnosis    Dizzy spells    Syncope and collapse    Atrial fibrillation with RVR (HCC)    Multiple closed fractures of ribs of left side    Fall    Orthostatic hypotension     Acute kidney injury    Coronary artery disease involving native coronary artery of native heart without angina pectoris    Perinephric hematoma    Acute blood loss anemia    FREDA (acute kidney injury)    Retroperitoneal hematoma    Rigors    Allergic drug reaction    Chest pain of uncertain etiology    Atrial fibrillation with controlled ventricular response (HCC)    PAF (paroxysmal atrial fibrillation) (HCC)    Umbilical hernia without obstruction and without gangrene   [2]   No current facility-administered medications on file prior to encounter.     Current Outpatient Medications on File Prior to Encounter   Medication Sig Dispense Refill    Aspirin 81 MG Oral Cap Take 1 capsule by mouth daily.      atorvastatin 80 MG Oral Tab Take 1 tablet (80 mg total) by mouth nightly.      TURMERIC OR Take 500 mg by mouth daily.

## 2025-06-08 NOTE — ED INITIAL ASSESSMENT (HPI)
Right sided ear pain x 1 week  Right sided gland swelling  Now having pain behind the ear  Tasting salt and metal  Bilateral hearing aids

## 2025-06-20 ENCOUNTER — OFFICE VISIT (OUTPATIENT)
Dept: SURGERY | Facility: CLINIC | Age: 65
End: 2025-06-20

## 2025-06-20 ENCOUNTER — LAB ENCOUNTER (OUTPATIENT)
Dept: LAB | Facility: HOSPITAL | Age: 65
End: 2025-06-20
Payer: COMMERCIAL

## 2025-06-20 DIAGNOSIS — S37.019A PERINEPHRIC HEMATOMA: ICD-10-CM

## 2025-06-20 DIAGNOSIS — R43.8 METALLIC TASTE: Primary | ICD-10-CM

## 2025-06-20 DIAGNOSIS — R43.8 METALLIC TASTE: ICD-10-CM

## 2025-06-20 LAB
ALBUMIN SERPL-MCNC: 4.5 G/DL (ref 3.2–4.8)
ALBUMIN/GLOB SERPL: 2 {RATIO} (ref 1–2)
ALP LIVER SERPL-CCNC: 42 U/L (ref 45–117)
ALT SERPL-CCNC: 37 U/L (ref 10–49)
ANION GAP SERPL CALC-SCNC: 6 MMOL/L (ref 0–18)
APPEARANCE: CLEAR
AST SERPL-CCNC: 49 U/L (ref ?–34)
BILIRUB SERPL-MCNC: 0.8 MG/DL (ref 0.2–1.1)
BILIRUBIN: NEGATIVE
BUN BLD-MCNC: 17 MG/DL (ref 9–23)
CALCIUM BLD-MCNC: 9.8 MG/DL (ref 8.7–10.6)
CHLORIDE SERPL-SCNC: 106 MMOL/L (ref 98–112)
CO2 SERPL-SCNC: 30 MMOL/L (ref 21–32)
CREAT BLD-MCNC: 1.26 MG/DL (ref 0.7–1.3)
EGFRCR SERPLBLD CKD-EPI 2021: 64 ML/MIN/1.73M2 (ref 60–?)
FASTING STATUS PATIENT QL REPORTED: YES
GLOBULIN PLAS-MCNC: 2.3 G/DL (ref 2–3.5)
GLUCOSE (URINE DIPSTICK): NEGATIVE MG/DL
GLUCOSE BLD-MCNC: 104 MG/DL (ref 70–99)
KETONES (URINE DIPSTICK): NEGATIVE MG/DL
LEUKOCYTES: NEGATIVE
MULTISTIX LOT#: NORMAL NUMERIC
NITRITE, URINE: NEGATIVE
OCCULT BLOOD: NEGATIVE
OSMOLALITY SERPL CALC.SUM OF ELEC: 296 MOSM/KG (ref 275–295)
PH, URINE: 5.5 (ref 4.5–8)
POTASSIUM SERPL-SCNC: 4.8 MMOL/L (ref 3.5–5.1)
PROT SERPL-MCNC: 6.8 G/DL (ref 5.7–8.2)
PROTEIN (URINE DIPSTICK): NEGATIVE MG/DL
SODIUM SERPL-SCNC: 142 MMOL/L (ref 136–145)
SPECIFIC GRAVITY: 1.02 (ref 1–1.03)
URINE-COLOR: YELLOW
UROBILINOGEN,SEMI-QN: 0.2 MG/DL (ref 0–1.9)

## 2025-06-20 PROCEDURE — 36415 COLL VENOUS BLD VENIPUNCTURE: CPT

## 2025-06-20 PROCEDURE — 80053 COMPREHEN METABOLIC PANEL: CPT

## 2025-06-20 PROCEDURE — 81003 URINALYSIS AUTO W/O SCOPE: CPT

## 2025-06-20 PROCEDURE — 99202 OFFICE O/P NEW SF 15 MIN: CPT

## 2025-06-20 RX ORDER — MULTIVITAMIN
TABLET ORAL
COMMUNITY

## 2025-06-20 RX ORDER — NICOTINE POLACRILEX 2 MG
1 GUM BUCCAL DAILY
COMMUNITY

## 2025-06-20 NOTE — PROGRESS NOTES
HPI:     Allan Peoples is a 65 year old male with hx of BPH, coronary atherosclerosis, and HLD, who presents to the office today for metallic taste in mouth    PCP: Dr. Nghia Garcia  Prior urologist(s): Dr. Fan Rivera/Dr. Allan Garay (Oct 2020-Nov 2021)    Previously seen by our practice for BPH/LUTS and later for Left perinephric hematoma that occurred following a syncopal episode (fell in bathroom in Apr 2021). During LOV 11/16/21, recent CT scan showed that Left perinephric hematoma nearly resolved. LUTS had resolved. At that time, pt was advised to f/u w/Urology as needed.     Today~  C/o: metalitic taste in mouth  Approx onset: 3-4 weeks ago  Description of issue:   - developed persistent bothersome metallic taste in mouth 3-4 wks ago. Patient concerned that he may have a recurrence of the perinephric hematoma and believes that the 2 may be connected  - no other new symptoms since onset of metallic taste; no new urinary complaints  - reports that he has been on a zinc supplement since being told that he has a swollen gland in his neck (was seen at an , was told swollen gland not r/t bacterial infection) on 06/08/25. Affirms that he started the zinc sometime after the metallic taste started.   - no significant change to appearance of urine but feels like it has been more yellow since starting MVI supplement    Denies:  gross hematuria, urinary frequency, urinary urgency, F/C/N/V, recent injury/trauma/surgery of abdomen or flank, abdominal/flank/pelvic pain    UA today neg blood, nitrites, leuks  AUA SS 7/35: 1/5, 3/5, 0/5, 1/5, 0/5, 0/5, and 2/5.  QOL 2/6 mostly satisfied    Prior results:   Most recent  RBC: 4.72 / HGB: 15.7 (05/30/24)  Most recent Scr: 1.23 (10/12/21)  Total PSA: 1.39 (09/20/24), 1.22 (02/28/23), 0.84 (10/06/20)  (08/06/24) CT A/P: stable 5mm nonobstructing midpole Left renal stone. Interval resolution of previously demonstrated Left perinephric hematoma. Some minimal residual scaring  noted posterior to lower pole Left kidney. No hydronephrosis. No ureteral or bladder stones.  No gross renal mass seen. No visible focal bladder wall thickening, lesions, or stones. No pelvic adenopathy or visible pelvic mass. Pelvic organs appropriate for pt age.   (09/24/21) CT A/P: previously described Left perinephric resolving hematoma/seroma has significantly decreased in size now measuring max axial dimensions of 2 x 1.4cm (previously was 7.4 x 4.2cm)/  4mm non-obstructing stone interpolar region Left kidney.     HISTORY:  Past Medical History[1]   Past Surgical History[2]   Family History[3]   Social History: Short Social Hx on File[4]     Medications (Active prior to today's visit):  Current Medications[5]    Allergies:  Allergies[6]    ROS:     A comprehensive 10 point review of systems was completed.  Pertinent positives and negatives noted in the the HPI.    PHYSICAL EXAM:     GENERAL APPEARANCE: well, developed, well nourished, in no acute distress  NEUROLOGIC: nonfocal, alert and oriented  HEAD: normocephalic, atraumatic  EYES: sclera non-icteric  EARS: hearing intact; hearing aid Left ear  ORAL CAVITY: mucosa moist  NECK/THYROID: no obvious goiter or masses  LUNGS: nonlabored breathing  ABDOMEN: soft, no obvious masses or tenderness. No CVAT.   SKIN: no obvious rashes     ASSESSMENT/PLAN:   Diagnoses and all orders for this visit:    Perinephric hematoma  -     URINALYSIS, AUTO, W/O SCOPE    Metallic taste  - reassured pt unlikely urologic cause for persistent metallic taste but since no recent renal function labs, will order CMP to r/o possible CKD  - while metallic taste started prior to zinc tabs, can try to stop zinc supplement to see if taste improves  - pt still concerned for possible return of perinephric hematoma despite lack of recent trauma/injury to kidneys. Can get CT A/P for peace of mind.   - pt advised to f/u with PCP for possible additional testing for other non-urologic causes for  metallic taste  - pt also overdue for yearly PSA screening, pt also reports overdue for Annual Physical; again pt advised to see PCP for routine yearly labs including PSA    Follow-up: as needed for urologic concerns    PRETTY Sorensen  Department of Urology  [facility]     I have spent 25 min total time on the day of the encounter, including: review of chart including lab and imaging studies, obtaining and/or reviewing separately obtained history, performing a medically appropriate examination and/or evaluation, counseling and educating the patient/family/caregiver, ordering medications/tests/procedures, documenting clinical information in Epic, and independently interpreting results and communicating results to the patient/family/caregiver.          [1]   Past Medical History:   Arrhythmia    resolved AF    Atherosclerosis of coronary artery    Back problem    BPH (benign prostatic hyperplasia)    Coronary atherosclerosis    Dizziness    Hearing impairment    High cholesterol    History of angioplasty    Other and unspecified hyperlipidemia    Pneumonia due to organism    Visual impairment   [2]   Past Surgical History:  Procedure Laterality Date    Angioplasty (coronary)      Cath drug eluting stent      Hernia surgery      Ndsc ablation & rcnstj at[facility] w/o bypass  04/2021    Other      reattachment tendon elbow bilaterl    Removal of tonsils,13+ y/o      Tonsillectomy     [3]   Family History  Problem Relation Age of Onset    Heart Disease Father         Lung Cancer    Cancer Father     Heart Disease Mother         Emphysema    Hypertension Brother     Cancer Brother     Heart Disease Brother         Rare Blood Cancer   [4]   Social History  Socioeconomic History    Marital status:    Occupational History    Occupation:      Employer: [employer]   Tobacco Use    Smoking status: Former     Current packs/day: 0.00     Average packs/day: 1 pack/day for 25.0  years (25.0 ttl pk-yrs)     Types: Cigarettes     Start date:      Quit date: 2004     Years since quittin.1    Smokeless tobacco: Never    Tobacco comments:     quit 6 years ago   Vaping Use    Vaping status: Never Used   Substance and Sexual Activity    Alcohol use: Yes     Alcohol/week: 2.0 standard drinks of alcohol     Types: 2 Glasses of wine per week     Comment: 1-2 drinks on weekends    Drug use: Not Currently     Types: Cannabis     Comment: gummies a few months ago   Other Topics Concern    Caffeine Concern No    Exercise No   [5]   Current Outpatient Medications   Medication Sig Dispense Refill    Multiple Vitamin (MULTI-VITAMIN DAILY) Oral Tab Take by mouth.      Biotin 1 MG Oral Cap Take 1 mg by mouth in the morning.      Aspirin 81 MG Oral Cap Take 1 capsule by mouth daily.      atorvastatin 80 MG Oral Tab Take 1 tablet (80 mg total) by mouth nightly.      TURMERIC OR Take 500 mg by mouth daily.     [6]   Allergies  Allergen Reactions    Amiodarone OTHER (SEE COMMENTS)     Received IV amio gtt on  and developed rigorous tremors, hypertension, and grey complexion. Improved after benadryll, pepcid, and tylenol given.    Bee Venom ANAPHYLAXIS

## (undated) DEVICE — TRAY CATH 16FR F INCL BARDX IC COMPLT CARE

## (undated) DEVICE — TIP COVER ACCESSORY

## (undated) DEVICE — SEAL

## (undated) DEVICE — FENESTRATED BIPOLAR FORCEPS: Brand: ENDOWRIST

## (undated) DEVICE — SUT COAT VCRL 2-0 27IN SH ABSRB UD 26MM 1/2

## (undated) DEVICE — ENDOPATH ULTRA VERESS INSUFFLATION NEEDLES WITH LUER LOCK CONNECTORS: Brand: ENDOPATH

## (undated) DEVICE — MEGA SUTURECUT ND: Brand: ENDOWRIST

## (undated) DEVICE — COVER,LIGHT,CAMERA,HARD,1/PK,STRL: Brand: MEDLINE

## (undated) DEVICE — ARM DRAPE

## (undated) DEVICE — 40580 - THE PINK PAD - ADVANCED TRENDELENBURG POSITIONING KIT: Brand: 40580 - THE PINK PAD - ADVANCED TRENDELENBURG POSITIONING KIT

## (undated) DEVICE — GRABBER GRASPER TIP, DISPOSABLE: Brand: RENEW

## (undated) DEVICE — MONOPOLAR CURVED SCISSORS: Brand: ENDOWRIST

## (undated) DEVICE — SYRINGE MED 10ML LL TIP W/O SFTY DISP

## (undated) DEVICE — PBT NON ABSORBABLE WOUND CLOSURE DEVICE: Brand: V-LOC

## (undated) DEVICE — LAPAROVUE VISIBILITY SYSTEM LAPAROSCOPIC SOLUTIONS: Brand: LAPAROVUE

## (undated) DEVICE — GLOVE SUR 7.5 SENSICARE PI PIP GRN PWD F

## (undated) DEVICE — ADHESIVE SKIN TOP FOR WND CLSR DERMBND ADV

## (undated) DEVICE — STERILE DRAPE FOR USE WITH SITUATE ROOM SCANNER: Brand: SITUATE

## (undated) DEVICE — GLOVE SUR 7 SENSICARE PI PIP CRM PWD F

## (undated) DEVICE — SUT MCRYL 4-0 18IN PS-2 ABSRB UD 19MM 3/8 CIR

## (undated) DEVICE — ABSORBABLE WOUND CLOSURE DEVICE: Brand: V-LOC 180

## (undated) DEVICE — COLUMN DRAPE

## (undated) DEVICE — ROBOTIC GENERAL: Brand: MEDLINE INDUSTRIES, INC.

## (undated) DEVICE — BLADELESS OBTURATOR: Brand: WECK VISTA

## (undated) DEVICE — VIOLET BRAIDED (POLYGLACTIN 910), SYNTHETIC ABSORBABLE SUTURE: Brand: COATED VICRYL

## (undated) NOTE — LETTER
[facility] [address], [address]  Consent for Procedure/Sedation    Date: 04/12/2021    Time: 2758      1.  I authorize the performance upon Merlinda Barb the following:cardiac catheterization, left ventricular cineangiography, b Signature of person authorized                                     Relationship to  to consent for patient: _________________________ patient: ___________________    Witness: _______________________________ Date: _____________________    Printed: 4/12/2021

## (undated) NOTE — ED AVS SNAPSHOT
Hannah Richardms   MRN: [id number]    Department:  [facility] Emergency Department   Date of Visit:  4/16/2019           Disclosure     Insurance plans vary and the physician(s) referred by the ER may not be covered by your plan.  Please contact your tell this physician (or your personal doctor if your instructions are to return to your personal doctor) about any new or lasting problems. The primary care or specialist physician will see patients referred from the [facility] Emergency Department.  Gabo León

## (undated) NOTE — ED AVS SNAPSHOT
Janeen Chavez   MRN: [id number]    Department:  [facility] Emergency Department   Date of Visit:  3/15/2018           Disclosure     Insurance plans vary and the physician(s) referred by the ER may not be covered by your plan.  Please contact your tell this physician (or your personal doctor if your instructions are to return to your personal doctor) about any new or lasting problems. The primary care or specialist physician will see patients referred from the [facility] Emergency Department.  Mandy Pete

## (undated) NOTE — ED AVS SNAPSHOT
Kierra Zheng   MRN: [id number]    Department:  [facility] Emergency Department   Date of Visit:  11/3/2019           Disclosure     Insurance plans vary and the physician(s) referred by the ER may not be covered by your plan.  Please contact your tell this physician (or your personal doctor if your instructions are to return to your personal doctor) about any new or lasting problems. The primary care or specialist physician will see patients referred from the [facility] Emergency Department.  Cheryle Levins

## (undated) NOTE — LETTER
Dr. Pagan    Surgical Clearance Needed    Date: 9/16/2024                                                                       From: Chelsey Wood: Dr. Owens                                                                                Fax:     RE: Surgical Clearance               # of Pages: 1        Patient Name: Allan Peoples    Patient YOB: 1960    Consult For: Cardiac Clearance    Surgery: Robotic umbilical herniorrhaphy    Date of Surgery: 12/11/2024 at [facility]        This facsimile transmission is provided for your internal use only. If the reader of this message is not the intended recipient, you are hereby notified that you have received this document in error, and that any review, dissemination, distribution, or copying of this message is strictly prohibited. If you have received this communication in error, please notify us immediately by telephone and return the original message to us by mail.

## (undated) NOTE — LETTER
[facility] [address], [address]  Consent for Procedure/Sedation    Date: 4/12/2021    Time: 4:40 p.m.      1. I authorize the performance upon Tevin Nicole the following:  Insertion of Linq recording device    2.  I authorize D : 1960       Medical Record #: [id number]

## (undated) NOTE — LETTER
[facility] [address], [address] [place] [address]  Consent for Procedure/Sedation    Date: ***    Time: ***      {edw ivs consent:9161}

## (undated) NOTE — LETTER
[facility] [address], [address]  Consent for Procedure/Sedation    Date: __4/13/2021_______    Time: ____________      1.  I authorize the performance upon Garcia Bump the following:cardiac catheterization, left ventricular ci ____________________________________________________    Signature of person authorized                                     Relationship to  to consent for patient: _________________________ patient: ___________________    Witness: _________________________